# Patient Record
Sex: FEMALE | Race: WHITE
[De-identification: names, ages, dates, MRNs, and addresses within clinical notes are randomized per-mention and may not be internally consistent; named-entity substitution may affect disease eponyms.]

---

## 2017-04-28 ENCOUNTER — HOSPITAL ENCOUNTER (OUTPATIENT)
Dept: HOSPITAL 83 - LAB | Age: 72
Discharge: HOME | End: 2017-04-28
Attending: INTERNAL MEDICINE
Payer: MEDICARE

## 2017-04-28 DIAGNOSIS — E11.9: ICD-10-CM

## 2017-04-28 DIAGNOSIS — I10: ICD-10-CM

## 2017-04-28 DIAGNOSIS — E03.9: Primary | ICD-10-CM

## 2017-04-28 LAB
ALBUMIN SERPL-MCNC: 3.3 GM/DL (ref 3.1–4.5)
ALP SERPL-CCNC: 133 U/L (ref 45–117)
ALT SERPL W P-5'-P-CCNC: 29 U/L (ref 12–78)
AST SERPL-CCNC: 25 IU/L (ref 3–35)
BASOPHILS # BLD AUTO: 0.1 10*3/UL (ref 0–0.1)
BASOPHILS NFR BLD AUTO: 0.9 % (ref 0–1)
BUN SERPL-MCNC: 14 MG/DL (ref 7–24)
CHLORIDE SERPL-SCNC: 101 MMOL/L (ref 98–107)
CHOLEST SERPL-MCNC: 133 MG/DL (ref ?–200)
CO2 SERPL-SCNC: 29 MMOL/L (ref 21–32)
EOSINOPHIL # BLD AUTO: 0.1 10*3/UL (ref 0–0.4)
EOSINOPHIL # BLD AUTO: 2.5 % (ref 1–4)
ERYTHROCYTE [DISTWIDTH] IN BLOOD BY AUTOMATED COUNT: 13.1 % (ref 0–14.5)
EST. AVERAGE GLUCOSE BLD GHB EST-MCNC: 246 MG/DL
GLUCOSE SERPL-MCNC: 217 MG/DL (ref 65–99)
HCT VFR BLD AUTO: 42.6 % (ref 37–47)
HDLC SERPL-MCNC: 61 MG/DL (ref 40–60)
HGB BLD-MCNC: 14.5 G/DL (ref 12–16)
IG #: 0 10*3/UL (ref 0–0.1)
LDLC SERPL DIRECT ASSAY-MCNC: 50 MG/DL (ref 9–159)
LYMPHOCYTES # BLD AUTO: 1.1 10*3/UL (ref 1.3–4.4)
LYMPHOCYTES NFR BLD AUTO: 19 % (ref 27–41)
MCH RBC QN AUTO: 32.7 PG (ref 27–31)
MCHC RBC AUTO-ENTMCNC: 34 G/DL (ref 33–37)
MCV RBC AUTO: 95.9 FL (ref 81–99)
MONOCYTES # BLD AUTO: 0.3 10*3/UL (ref 0.1–1)
MONOCYTES NFR BLD MANUAL: 5.6 % (ref 3–9)
NEUT #: 4.1 10*3/UL (ref 2.3–7.9)
NEUT %: 71.8 % (ref 47–73)
NRBC BLD QL AUTO: 0 % (ref 0–0)
PHOSPHATE SERPL-MCNC: 2.7 MG/DL (ref 2.5–4.9)
PLATELET # BLD AUTO: 125 10*3/UL (ref 130–400)
PMV BLD AUTO: 10.2 FL (ref 9.6–12.3)
POTASSIUM SERPL-SCNC: 4.5 MMOL/L (ref 3.5–5.1)
PROT SERPL-MCNC: 6.9 GM/DL (ref 6.4–8.2)
RBC # BLD AUTO: 4.44 10*6/UL (ref 4.1–5.1)
SODIUM SERPL-SCNC: 139 MMOL/L (ref 136–145)
TRIGL SERPL-MCNC: 108 MG/DL (ref ?–150)
TSH SERPL DL<=0.005 MIU/L-ACNC: 6.93 UIU/ML (ref 0.36–4.75)
VLDLC SERPL CALC-MCNC: 22 MG/DL (ref 6–40)
WBC NRBC COR # BLD AUTO: 5.7 10*3/UL (ref 4.8–10.8)

## 2017-06-09 ENCOUNTER — HOSPITAL ENCOUNTER (EMERGENCY)
Dept: HOSPITAL 83 - ED | Age: 72
Discharge: HOME | End: 2017-06-09
Payer: MEDICARE

## 2017-06-09 VITALS — BODY MASS INDEX: 50.02 KG/M2 | WEIGHT: 293 LBS | HEIGHT: 63.98 IN

## 2017-06-09 VITALS — DIASTOLIC BLOOD PRESSURE: 93 MMHG | SYSTOLIC BLOOD PRESSURE: 167 MMHG

## 2017-06-09 DIAGNOSIS — N30.01: Primary | ICD-10-CM

## 2017-06-09 DIAGNOSIS — Z79.82: ICD-10-CM

## 2017-06-09 DIAGNOSIS — Z79.899: ICD-10-CM

## 2017-06-09 DIAGNOSIS — Z91.041: ICD-10-CM

## 2017-06-09 LAB
ALBUMIN SERPL-MCNC: 2.9 GM/DL (ref 3.1–4.5)
ALBUMIN SERPL-MCNC: NEGATIVE G/DL
ALP SERPL-CCNC: 154 U/L (ref 45–117)
ALT SERPL W P-5'-P-CCNC: 21 U/L (ref 12–78)
APPEARANCE UR: (no result)
AST SERPL-CCNC: 24 IU/L (ref 3–35)
BACTERIA #/AREA URNS HPF: (no result) /[HPF]
BASOPHILS # BLD AUTO: 0 10*3/UL (ref 0–0.1)
BASOPHILS NFR BLD AUTO: 0.6 % (ref 0–1)
BILIRUB UR QL STRIP: NEGATIVE
BUN SERPL-MCNC: 16 MG/DL (ref 7–24)
CHLORIDE SERPL-SCNC: 101 MMOL/L (ref 98–107)
CO2 SERPL-SCNC: 28 MMOL/L (ref 21–32)
COLOR UR: YELLOW
EOSINOPHIL # BLD AUTO: 0.2 10*3/UL (ref 0–0.4)
EOSINOPHIL # BLD AUTO: 4.1 % (ref 1–4)
EPI CELLS #/AREA URNS HPF: (no result) /[HPF]
ERYTHROCYTE [DISTWIDTH] IN BLOOD BY AUTOMATED COUNT: 12.9 % (ref 0–14.5)
GLUCOSE SERPL-MCNC: 304 MG/DL (ref 65–99)
GLUCOSE UR QL: (no result)
HCT VFR BLD AUTO: 36.9 % (ref 37–47)
HGB BLD-MCNC: 12.4 G/DL (ref 12–16)
HGB UR QL STRIP: (no result)
IG #: 0 10*3/UL (ref 0–0.1)
KETONES UR QL STRIP: NEGATIVE
LA>2 REFLEX 2 HR: (no result)
LEUKOCYTE ESTERASE UR QL STRIP: (no result)
LYMPHOCYTES # BLD AUTO: 1.1 10*3/UL (ref 1.3–4.4)
LYMPHOCYTES NFR BLD AUTO: 23.9 % (ref 27–41)
MCH RBC QN AUTO: 32.2 PG (ref 27–31)
MCHC RBC AUTO-ENTMCNC: 33.6 G/DL (ref 33–37)
MCV RBC AUTO: 95.8 FL (ref 81–99)
MONOCYTES # BLD AUTO: 0.3 10*3/UL (ref 0.1–1)
MONOCYTES NFR BLD MANUAL: 5.8 % (ref 3–9)
NEUT #: 3.1 10*3/UL (ref 2.3–7.9)
NEUT %: 65.4 % (ref 47–73)
NITRITE UR QL STRIP: NEGATIVE
NRBC BLD QL AUTO: 0 % (ref 0–0)
PH UR STRIP: 6 [PH] (ref 5–9)
PLATELET # BLD AUTO: 105 10*3/UL (ref 130–400)
PMV BLD AUTO: 10.3 FL (ref 9.6–12.3)
POTASSIUM SERPL-SCNC: 4.4 MMOL/L (ref 3.5–5.1)
PROT SERPL-MCNC: 6.3 GM/DL (ref 6.4–8.2)
RBC # BLD AUTO: 3.85 10*6/UL (ref 4.1–5.1)
RBC #/AREA URNS HPF: (no result) RBC/HPF (ref 0–2)
SODIUM SERPL-SCNC: 139 MMOL/L (ref 136–145)
SP GR UR: <= 1.005 (ref 1–1.03)
URINE REFLEX COMMENT: YES
UROBILINOGEN UR STRIP-MCNC: 0.2 E.U./DL (ref 0.2–1)
WBC #/AREA URNS HPF: (no result) WBC/HPF (ref 0–5)
WBC NRBC COR # BLD AUTO: 4.7 10*3/UL (ref 4.8–10.8)

## 2017-07-17 ENCOUNTER — HOSPITAL ENCOUNTER (EMERGENCY)
Dept: HOSPITAL 83 - ED | Age: 72
LOS: 1 days | Discharge: HOME | End: 2017-07-18
Payer: MEDICARE

## 2017-07-17 VITALS — HEIGHT: 65.98 IN | BODY MASS INDEX: 47.09 KG/M2 | WEIGHT: 293 LBS

## 2017-07-17 VITALS — DIASTOLIC BLOOD PRESSURE: 91 MMHG

## 2017-07-17 DIAGNOSIS — E86.0: ICD-10-CM

## 2017-07-17 DIAGNOSIS — E03.9: ICD-10-CM

## 2017-07-17 DIAGNOSIS — N18.9: ICD-10-CM

## 2017-07-17 DIAGNOSIS — N39.0: Primary | ICD-10-CM

## 2017-07-17 DIAGNOSIS — R31.9: ICD-10-CM

## 2017-07-17 DIAGNOSIS — Z79.899: ICD-10-CM

## 2017-07-17 DIAGNOSIS — Z79.82: ICD-10-CM

## 2017-07-17 DIAGNOSIS — Z91.041: ICD-10-CM

## 2017-07-17 DIAGNOSIS — Z90.49: ICD-10-CM

## 2017-07-17 DIAGNOSIS — Z91.018: ICD-10-CM

## 2017-07-17 DIAGNOSIS — E11.9: ICD-10-CM

## 2017-07-17 DIAGNOSIS — Z79.4: ICD-10-CM

## 2017-07-17 LAB
ALBUMIN SERPL-MCNC: 3.3 GM/DL (ref 3.1–4.5)
ALBUMIN SERPL-MCNC: NEGATIVE G/DL
ALP SERPL-CCNC: 113 U/L (ref 45–117)
ALT SERPL W P-5'-P-CCNC: 23 U/L (ref 12–78)
APPEARANCE UR: CLEAR
AST SERPL-CCNC: 23 IU/L (ref 3–35)
BACTERIA #/AREA URNS HPF: (no result) /[HPF]
BASOPHILS # BLD AUTO: 0 10*3/UL (ref 0–0.1)
BASOPHILS NFR BLD AUTO: 0.7 % (ref 0–1)
BILIRUB UR QL STRIP: NEGATIVE
BUN SERPL-MCNC: 25 MG/DL (ref 7–24)
CHLORIDE SERPL-SCNC: 101 MMOL/L (ref 98–107)
CO2 SERPL-SCNC: 24 MMOL/L (ref 21–32)
COLOR UR: YELLOW
CRP SERPL-MCNC: 0.85 MG/DL (ref 0–0.3)
EOSINOPHIL # BLD AUTO: 0.2 10*3/UL (ref 0–0.4)
EOSINOPHIL # BLD AUTO: 3.2 % (ref 1–4)
EPI CELLS #/AREA URNS HPF: (no result) /[HPF]
ERYTHROCYTE [DISTWIDTH] IN BLOOD BY AUTOMATED COUNT: 13.2 % (ref 0–14.5)
GLUCOSE SERPL-MCNC: 261 MG/DL (ref 65–99)
GLUCOSE UR QL: NEGATIVE
HCT VFR BLD AUTO: 41.1 % (ref 37–47)
HGB BLD-MCNC: 13.7 G/DL (ref 12–16)
HGB UR QL STRIP: (no result)
IG #: 0 10*3/UL (ref 0–0.1)
KETONES UR QL STRIP: NEGATIVE
LA>2 REFLEX 2 HR: (no result)
LEUKOCYTE ESTERASE UR QL STRIP: (no result)
LYMPHOCYTES # BLD AUTO: 1.5 10*3/UL (ref 1.3–4.4)
LYMPHOCYTES NFR BLD AUTO: 26.1 % (ref 27–41)
MAGNESIUM SERPL-MCNC: 1.7 MG/DL (ref 1.5–2.1)
MCH RBC QN AUTO: 32.1 PG (ref 27–31)
MCHC RBC AUTO-ENTMCNC: 33.3 G/DL (ref 33–37)
MCV RBC AUTO: 96.3 FL (ref 81–99)
MONOCYTES # BLD AUTO: 0.3 10*3/UL (ref 0.1–1)
MONOCYTES NFR BLD MANUAL: 5.8 % (ref 3–9)
NEUT #: 3.8 10*3/UL (ref 2.3–7.9)
NEUT %: 63.9 % (ref 47–73)
NITRITE UR QL STRIP: NEGATIVE
NRBC BLD QL AUTO: 0 10*3/UL (ref 0–0)
PH UR STRIP: 5 [PH] (ref 5–9)
PLATELET # BLD AUTO: 139 10*3/UL (ref 130–400)
PMV BLD AUTO: 10.3 FL (ref 9.6–12.3)
POTASSIUM SERPL-SCNC: 4.4 MMOL/L (ref 3.5–5.1)
PROT SERPL-MCNC: 6.8 GM/DL (ref 6.4–8.2)
RBC # BLD AUTO: 4.27 10*6/UL (ref 4.1–5.1)
RBC #/AREA URNS HPF: (no result) RBC/HPF (ref 0–2)
SODIUM SERPL-SCNC: 135 MMOL/L (ref 136–145)
SP GR UR: <= 1.005 (ref 1–1.03)
URINE REFLEX COMMENT: YES
UROBILINOGEN UR STRIP-MCNC: 0.2 E.U./DL (ref 0.2–1)
WBC #/AREA URNS HPF: (no result) WBC/HPF (ref 0–5)
WBC NRBC COR # BLD AUTO: 5.9 10*3/UL (ref 4.8–10.8)

## 2017-07-21 ENCOUNTER — HOSPITAL ENCOUNTER (INPATIENT)
Dept: HOSPITAL 83 - ED | Age: 72
LOS: 1 days | Discharge: HOME | DRG: 871 | End: 2017-07-22
Attending: INTERNAL MEDICINE | Admitting: INTERNAL MEDICINE
Payer: MEDICARE

## 2017-07-21 VITALS — DIASTOLIC BLOOD PRESSURE: 75 MMHG | SYSTOLIC BLOOD PRESSURE: 143 MMHG

## 2017-07-21 VITALS — WEIGHT: 293 LBS | BODY MASS INDEX: 53.92 KG/M2 | HEIGHT: 61.97 IN

## 2017-07-21 VITALS — DIASTOLIC BLOOD PRESSURE: 71 MMHG

## 2017-07-21 VITALS — DIASTOLIC BLOOD PRESSURE: 52 MMHG | SYSTOLIC BLOOD PRESSURE: 143 MMHG

## 2017-07-21 VITALS — DIASTOLIC BLOOD PRESSURE: 67 MMHG

## 2017-07-21 DIAGNOSIS — Z79.4: ICD-10-CM

## 2017-07-21 DIAGNOSIS — E03.9: ICD-10-CM

## 2017-07-21 DIAGNOSIS — E11.22: ICD-10-CM

## 2017-07-21 DIAGNOSIS — E11.65: ICD-10-CM

## 2017-07-21 DIAGNOSIS — K21.9: ICD-10-CM

## 2017-07-21 DIAGNOSIS — I13.0: ICD-10-CM

## 2017-07-21 DIAGNOSIS — Z96.651: ICD-10-CM

## 2017-07-21 DIAGNOSIS — N39.0: ICD-10-CM

## 2017-07-21 DIAGNOSIS — A41.9: Primary | ICD-10-CM

## 2017-07-21 DIAGNOSIS — G47.33: ICD-10-CM

## 2017-07-21 DIAGNOSIS — Z90.49: ICD-10-CM

## 2017-07-21 DIAGNOSIS — E66.01: ICD-10-CM

## 2017-07-21 DIAGNOSIS — Z83.3: ICD-10-CM

## 2017-07-21 DIAGNOSIS — I50.32: ICD-10-CM

## 2017-07-21 DIAGNOSIS — G89.4: ICD-10-CM

## 2017-07-21 DIAGNOSIS — D69.6: ICD-10-CM

## 2017-07-21 DIAGNOSIS — D50.9: ICD-10-CM

## 2017-07-21 DIAGNOSIS — Z82.49: ICD-10-CM

## 2017-07-21 DIAGNOSIS — Z82.3: ICD-10-CM

## 2017-07-21 DIAGNOSIS — E55.9: ICD-10-CM

## 2017-07-21 DIAGNOSIS — N18.2: ICD-10-CM

## 2017-07-21 DIAGNOSIS — Z91.018: ICD-10-CM

## 2017-07-21 DIAGNOSIS — R65.20: ICD-10-CM

## 2017-07-21 DIAGNOSIS — M15.9: ICD-10-CM

## 2017-07-21 DIAGNOSIS — Z91.041: ICD-10-CM

## 2017-07-21 DIAGNOSIS — J44.9: ICD-10-CM

## 2017-07-21 DIAGNOSIS — R19.7: ICD-10-CM

## 2017-07-21 DIAGNOSIS — E43: ICD-10-CM

## 2017-07-21 DIAGNOSIS — D72.810: ICD-10-CM

## 2017-07-21 DIAGNOSIS — E53.9: ICD-10-CM

## 2017-07-21 LAB
ALBUMIN SERPL-MCNC: 2.9 GM/DL (ref 3.1–4.5)
ALBUMIN SERPL-MCNC: NEGATIVE G/DL
ALP SERPL-CCNC: 93 U/L (ref 45–117)
ALT SERPL W P-5'-P-CCNC: 19 U/L (ref 12–78)
APPEARANCE UR: (no result)
AST SERPL-CCNC: 24 IU/L (ref 3–35)
BACTERIA #/AREA URNS HPF: (no result) /[HPF]
BASOPHILS # BLD AUTO: 0 10*3/UL (ref 0–0.1)
BASOPHILS NFR BLD AUTO: 0.2 % (ref 0–1)
BILIRUB UR QL STRIP: NEGATIVE
BUN SERPL-MCNC: 15 MG/DL (ref 7–24)
CHLORIDE SERPL-SCNC: 104 MMOL/L (ref 98–107)
CO2 SERPL-SCNC: 22 MMOL/L (ref 21–32)
COLOR UR: YELLOW
EOSINOPHIL # BLD AUTO: 0.1 10*3/UL (ref 0–0.4)
EOSINOPHIL # BLD AUTO: 0.8 % (ref 1–4)
ERYTHROCYTE [DISTWIDTH] IN BLOOD BY AUTOMATED COUNT: 13.1 % (ref 0–14.5)
GLUCOSE SERPL-MCNC: 195 MG/DL (ref 65–99)
GLUCOSE UR QL: NEGATIVE
HCT VFR BLD AUTO: 40.6 % (ref 37–47)
HGB BLD-MCNC: 13.8 G/DL (ref 12–16)
HGB UR QL STRIP: NEGATIVE
IG #: 0 10*3/UL (ref 0–0.1)
KETONES UR QL STRIP: (no result)
LA>2 REFLEX 2 HR: (no result)
LEUKOCYTE ESTERASE UR QL STRIP: NEGATIVE
LYMPHOCYTES # BLD AUTO: 0.7 10*3/UL (ref 1.3–4.4)
LYMPHOCYTES NFR BLD AUTO: 11.3 % (ref 27–41)
MCH RBC QN AUTO: 32.2 PG (ref 27–31)
MCHC RBC AUTO-ENTMCNC: 34 G/DL (ref 33–37)
MCV RBC AUTO: 94.9 FL (ref 81–99)
MONOCYTES # BLD AUTO: 0.4 10*3/UL (ref 0.1–1)
MONOCYTES NFR BLD MANUAL: 6 % (ref 3–9)
NEUT #: 5.3 10*3/UL (ref 2.3–7.9)
NEUT %: 81.4 % (ref 47–73)
NITRITE UR QL STRIP: NEGATIVE
NRBC BLD QL AUTO: 0 % (ref 0–0)
PH BLDV: 7.45 [PH] (ref 7.32–7.43)
PH UR STRIP: 5.5 [PH] (ref 5–9)
PLATELET # BLD AUTO: 109 10*3/UL (ref 130–400)
PMV BLD AUTO: 9.9 FL (ref 9.6–12.3)
POTASSIUM SERPL-SCNC: 4 MMOL/L (ref 3.5–5.1)
PROT SERPL-MCNC: 6.4 GM/DL (ref 6.4–8.2)
RBC # BLD AUTO: 4.28 10*6/UL (ref 4.1–5.1)
RBC #/AREA URNS HPF: (no result) RBC/HPF (ref 0–2)
SAO2 % BLDV: 97.7 % (ref 40–85)
SODIUM SERPL-SCNC: 138 MMOL/L (ref 136–145)
SP GR UR: <= 1.005 (ref 1–1.03)
TROPONIN I SERPL-MCNC: < 0.015 NG/ML (ref ?–0.04)
URINE REFLEX COMMENT: NO
UROBILINOGEN UR STRIP-MCNC: 0.2 E.U./DL (ref 0.2–1)
WBC #/AREA URNS HPF: (no result) WBC/HPF (ref 0–5)
WBC NRBC COR # BLD AUTO: 6.5 10*3/UL (ref 4.8–10.8)

## 2017-07-22 VITALS — DIASTOLIC BLOOD PRESSURE: 54 MMHG

## 2017-07-22 VITALS — DIASTOLIC BLOOD PRESSURE: 74 MMHG | SYSTOLIC BLOOD PRESSURE: 149 MMHG

## 2017-07-22 VITALS — DIASTOLIC BLOOD PRESSURE: 56 MMHG | SYSTOLIC BLOOD PRESSURE: 110 MMHG

## 2017-07-22 LAB
25(OH)D3 SERPL-MCNC: 46.5 NG/ML (ref 30–100)
ALBUMIN SERPL-MCNC: 2.3 GM/DL (ref 3.1–4.5)
ALP SERPL-CCNC: 69 U/L (ref 45–117)
ALT SERPL W P-5'-P-CCNC: 16 U/L (ref 12–78)
AST SERPL-CCNC: 20 IU/L (ref 3–35)
BASOPHILS # BLD AUTO: 0 10*3/UL (ref 0–0.1)
BASOPHILS NFR BLD AUTO: 0.2 % (ref 0–1)
BUN SERPL-MCNC: 14 MG/DL (ref 7–24)
CHLORIDE SERPL-SCNC: 109 MMOL/L (ref 98–107)
CO2 SERPL-SCNC: 26 MMOL/L (ref 21–32)
EOSINOPHIL # BLD AUTO: 0.1 10*3/UL (ref 0–0.4)
EOSINOPHIL # BLD AUTO: 1.6 % (ref 1–4)
ERYTHROCYTE [DISTWIDTH] IN BLOOD BY AUTOMATED COUNT: 13.3 % (ref 0–14.5)
EST. AVERAGE GLUCOSE BLD GHB EST-MCNC: 189 MG/DL
FOLATE SERPL-MCNC: 23.39 NG/ML (ref 5.38–?)
GLUCOSE SERPL-MCNC: 83 MG/DL (ref 65–99)
HCT VFR BLD AUTO: 34 % (ref 37–47)
HGB BLD-MCNC: 11.4 G/DL (ref 12–16)
IG #: 0 10*3/UL (ref 0–0.1)
INR BLD: 1.1 (ref 2–3.5)
LA>2 REFLEX 4 HR: (no result)
LA>2 RFLX FOLLOW UP AT 2 HRS: 2.8 MMOL/L (ref 0.4–2)
LYMPHOCYTES # BLD AUTO: 0.9 10*3/UL (ref 1.3–4.4)
LYMPHOCYTES NFR BLD AUTO: 17.7 % (ref 27–41)
MAGNESIUM SERPL-MCNC: 1.5 MG/DL (ref 1.5–2.1)
MCH RBC QN AUTO: 32.6 PG (ref 27–31)
MCHC RBC AUTO-ENTMCNC: 33.5 G/DL (ref 33–37)
MCV RBC AUTO: 97.1 FL (ref 81–99)
MONOCYTES # BLD AUTO: 0.3 10*3/UL (ref 0.1–1)
MONOCYTES NFR BLD MANUAL: 6.6 % (ref 3–9)
NEUT #: 3.8 10*3/UL (ref 2.3–7.9)
NEUT %: 73.7 % (ref 47–73)
NRBC BLD QL AUTO: 0 10*3/UL (ref 0–0)
PHOSPHATE SERPL-MCNC: 2.2 MG/DL (ref 2.5–4.9)
PLATELET # BLD AUTO: 91 10*3/UL (ref 130–400)
PMV BLD AUTO: 10.3 FL (ref 9.6–12.3)
POTASSIUM SERPL-SCNC: 3.9 MMOL/L (ref 3.5–5.1)
PROT SERPL-MCNC: 5.2 GM/DL (ref 6.4–8.2)
PROTHROMBIN TIME: 12.2 SECONDS (ref 9–12.4)
RBC # BLD AUTO: 3.5 10*6/UL (ref 4.1–5.1)
SODIUM SERPL-SCNC: 143 MMOL/L (ref 136–145)
T4 FREE SERPL-MCNC: 0.91 NG/DL (ref 0.76–1.46)
TSH SERPL DL<=0.005 MIU/L-ACNC: 4.77 UIU/ML (ref 0.36–4.75)
VITAMIN B12: 394 PG/ML (ref 247–911)
WBC NRBC COR # BLD AUTO: 5.1 10*3/UL (ref 4.8–10.8)

## 2018-05-16 ENCOUNTER — HOSPITAL ENCOUNTER (INPATIENT)
Dept: HOSPITAL 83 - ED | Age: 73
LOS: 4 days | Discharge: HOME | DRG: 190 | End: 2018-05-20
Attending: INTERNAL MEDICINE | Admitting: INTERNAL MEDICINE
Payer: MEDICARE

## 2018-05-16 VITALS
SYSTOLIC BLOOD PRESSURE: 114 MMHG | DIASTOLIC BLOOD PRESSURE: 69 MMHG | HEIGHT: 64 IN | WEIGHT: 293 LBS | BODY MASS INDEX: 50.02 KG/M2

## 2018-05-16 VITALS — DIASTOLIC BLOOD PRESSURE: 65 MMHG

## 2018-05-16 VITALS — DIASTOLIC BLOOD PRESSURE: 77 MMHG

## 2018-05-16 VITALS — DIASTOLIC BLOOD PRESSURE: 76 MMHG | SYSTOLIC BLOOD PRESSURE: 169 MMHG

## 2018-05-16 DIAGNOSIS — Z90.49: ICD-10-CM

## 2018-05-16 DIAGNOSIS — D69.6: ICD-10-CM

## 2018-05-16 DIAGNOSIS — D50.9: ICD-10-CM

## 2018-05-16 DIAGNOSIS — G47.33: ICD-10-CM

## 2018-05-16 DIAGNOSIS — N30.00: ICD-10-CM

## 2018-05-16 DIAGNOSIS — Z79.4: ICD-10-CM

## 2018-05-16 DIAGNOSIS — I50.9: ICD-10-CM

## 2018-05-16 DIAGNOSIS — Z82.49: ICD-10-CM
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
## 2018-05-16 DIAGNOSIS — J20.9: ICD-10-CM

## 2018-05-16 DIAGNOSIS — E87.2: ICD-10-CM

## 2018-05-16 DIAGNOSIS — J44.0: Primary | ICD-10-CM

## 2018-05-16 DIAGNOSIS — T17.590A: ICD-10-CM
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
## 2018-05-16 DIAGNOSIS — Z96.653: ICD-10-CM

## 2018-05-16 DIAGNOSIS — G89.4: ICD-10-CM

## 2018-05-16 DIAGNOSIS — E44.0: ICD-10-CM

## 2018-05-16 DIAGNOSIS — E66.01: ICD-10-CM

## 2018-05-16 DIAGNOSIS — E55.9: ICD-10-CM
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
## 2018-05-16 DIAGNOSIS — E11.22: ICD-10-CM

## 2018-05-16 DIAGNOSIS — M19.90: ICD-10-CM

## 2018-05-16 DIAGNOSIS — J18.9: ICD-10-CM

## 2018-05-16 DIAGNOSIS — Z88.8: ICD-10-CM

## 2018-05-16 DIAGNOSIS — E53.9: ICD-10-CM

## 2018-05-16 DIAGNOSIS — E03.9: ICD-10-CM

## 2018-05-16 DIAGNOSIS — Z79.899: ICD-10-CM

## 2018-05-16 DIAGNOSIS — Z82.5: ICD-10-CM

## 2018-05-16 DIAGNOSIS — Z79.82: ICD-10-CM

## 2018-05-16 DIAGNOSIS — K21.9: ICD-10-CM
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
## 2018-05-16 DIAGNOSIS — I13.0: ICD-10-CM

## 2018-05-16 DIAGNOSIS — Z90.710: ICD-10-CM

## 2018-05-16 DIAGNOSIS — R19.7: ICD-10-CM

## 2018-05-16 DIAGNOSIS — J45.901: ICD-10-CM

## 2018-05-16 DIAGNOSIS — J44.1: ICD-10-CM

## 2018-05-16 DIAGNOSIS — N18.2: ICD-10-CM

## 2018-05-16 DIAGNOSIS — Z91.041: ICD-10-CM

## 2018-05-16 DIAGNOSIS — E11.65: ICD-10-CM

## 2018-05-16 LAB
ALBUMIN SERPL-MCNC: 2.8 GM/DL (ref 3.1–4.5)
ALP SERPL-CCNC: 107 U/L (ref 45–117)
ALT SERPL W P-5'-P-CCNC: 17 U/L (ref 12–78)
APPEARANCE UR: CLEAR
APTT PPP: 22.3 SECONDS (ref 20.8–31.5)
AST SERPL-CCNC: 22 IU/L (ref 3–35)
BACTERIA #/AREA URNS HPF: (no result) /[HPF]
BASOPHILS # BLD AUTO: 0 10*3/UL (ref 0–0.1)
BASOPHILS NFR BLD AUTO: 0.8 % (ref 0–1)
BILIRUB UR QL STRIP: NEGATIVE
BUN SERPL-MCNC: 16 MG/DL (ref 7–24)
CHLORIDE SERPL-SCNC: 104 MMOL/L (ref 98–107)
COLOR UR: YELLOW
CREAT SERPL-MCNC: 0.94 MG/DL (ref 0.55–1.02)
EOSINOPHIL # BLD AUTO: 0.1 10*3/UL (ref 0–0.4)
EOSINOPHIL # BLD AUTO: 2.4 % (ref 1–4)
EPI CELLS #/AREA URNS HPF: (no result) /[HPF]
ERYTHROCYTE [DISTWIDTH] IN BLOOD BY AUTOMATED COUNT: 12.7 % (ref 0–14.5)
GLUCOSE UR QL: NEGATIVE
HCT VFR BLD AUTO: 35 % (ref 37–47)
HGB BLD-MCNC: 11.8 G/DL (ref 12–16)
HGB UR QL STRIP: NEGATIVE
INR BLD: 1 (ref 2–3.5)
KETONES UR QL STRIP: NEGATIVE
LEUKOCYTE ESTERASE UR QL STRIP: NEGATIVE
LYMPHOCYTES # BLD AUTO: 0.9 10*3/UL (ref 1.3–4.4)
LYMPHOCYTES NFR BLD AUTO: 18.7 % (ref 27–41)
MCH RBC QN AUTO: 32.4 PG (ref 27–31)
MCHC RBC AUTO-ENTMCNC: 33.7 G/DL (ref 33–37)
MCV RBC AUTO: 96.2 FL (ref 81–99)
MONOCYTES # BLD AUTO: 0.2 10*3/UL (ref 0.1–1)
MONOCYTES NFR BLD MANUAL: 4.4 % (ref 3–9)
NEUT #: 3.7 10*3/UL (ref 2.3–7.9)
NEUT %: 73.5 % (ref 47–73)
NITRITE UR QL STRIP: NEGATIVE
NRBC BLD QL AUTO: 0 % (ref 0–0)
PH UR STRIP: 8.5 [PH] (ref 5–9)
PLATELET # BLD AUTO: 126 10*3/UL (ref 130–400)
PMV BLD AUTO: 9.8 FL (ref 9.6–12.3)
POTASSIUM SERPL-SCNC: 4 MMOL/L (ref 3.5–5.1)
PROT SERPL-MCNC: 6.9 GM/DL (ref 6.4–8.2)
RBC # BLD AUTO: 3.64 10*6/UL (ref 4.1–5.1)
RBC #/AREA URNS HPF: (no result) RBC/HPF (ref 0–2)
SODIUM SERPL-SCNC: 138 MMOL/L (ref 136–145)
SP GR UR: 1.01 (ref 1–1.03)
TROPONIN I SERPL-MCNC: < 0.015 NG/ML (ref ?–0.04)
UROBILINOGEN UR STRIP-MCNC: 0.2 E.U./DL (ref 0.2–1)
WBC #/AREA URNS HPF: (no result) WBC/HPF (ref 0–5)
WBC NRBC COR # BLD AUTO: 5 10*3/UL (ref 4.8–10.8)

## 2018-05-17 VITALS — DIASTOLIC BLOOD PRESSURE: 87 MMHG

## 2018-05-17 VITALS — SYSTOLIC BLOOD PRESSURE: 146 MMHG | DIASTOLIC BLOOD PRESSURE: 84 MMHG

## 2018-05-17 VITALS — SYSTOLIC BLOOD PRESSURE: 154 MMHG | DIASTOLIC BLOOD PRESSURE: 90 MMHG

## 2018-05-17 VITALS — DIASTOLIC BLOOD PRESSURE: 70 MMHG

## 2018-05-17 VITALS — DIASTOLIC BLOOD PRESSURE: 68 MMHG

## 2018-05-17 LAB
25(OH)D3 SERPL-MCNC: 31.5 NG/ML (ref 30–100)
ALBUMIN SERPL-MCNC: 2.8 GM/DL (ref 3.1–4.5)
ALP SERPL-CCNC: 95 U/L (ref 45–117)
ALT SERPL W P-5'-P-CCNC: 15 U/L (ref 12–78)
APTT PPP: 22.9 SECONDS (ref 20.8–31.5)
AST SERPL-CCNC: 22 IU/L (ref 3–35)
BASOPHILS # BLD AUTO: 0 10*3/UL (ref 0–0.1)
BASOPHILS NFR BLD AUTO: 0 % (ref 0–1)
BUN SERPL-MCNC: 19 MG/DL (ref 7–24)
CHLORIDE SERPL-SCNC: 101 MMOL/L (ref 98–107)
CHOLEST SERPL-MCNC: 143 MG/DL (ref ?–200)
CREAT SERPL-MCNC: 1.06 MG/DL (ref 0.55–1.02)
EOSINOPHIL # BLD AUTO: 0 % (ref 1–4)
EOSINOPHIL # BLD AUTO: 0 10*3/UL (ref 0–0.4)
ERYTHROCYTE [DISTWIDTH] IN BLOOD BY AUTOMATED COUNT: 12.6 % (ref 0–14.5)
HCT VFR BLD AUTO: 37.3 % (ref 37–47)
HDLC SERPL-MCNC: 51 MG/DL (ref 40–60)
HGB BLD-MCNC: 12.4 G/DL (ref 12–16)
INR BLD: 1.1 (ref 2–3.5)
LDLC SERPL DIRECT ASSAY-MCNC: 75 MG/DL (ref 9–159)
LYMPHOCYTES # BLD AUTO: 0.6 10*3/UL (ref 1.3–4.4)
LYMPHOCYTES NFR BLD AUTO: 13.3 % (ref 27–41)
MCH RBC QN AUTO: 32 PG (ref 27–31)
MCHC RBC AUTO-ENTMCNC: 33.2 G/DL (ref 33–37)
MCV RBC AUTO: 96.4 FL (ref 81–99)
MONOCYTES # BLD AUTO: 0 10*3/UL (ref 0.1–1)
MONOCYTES NFR BLD MANUAL: 0.5 % (ref 3–9)
NEUT #: 3.6 10*3/UL (ref 2.3–7.9)
NEUT %: 85.7 % (ref 47–73)
NRBC BLD QL AUTO: 0 % (ref 0–0)
PHOSPHATE SERPL-MCNC: 3.3 MG/DL (ref 2.5–4.9)
PLATELET # BLD AUTO: 110 10*3/UL (ref 130–400)
PMV BLD AUTO: 9.9 FL (ref 9.6–12.3)
POTASSIUM SERPL-SCNC: 4.2 MMOL/L (ref 3.5–5.1)
PROT SERPL-MCNC: 6.7 GM/DL (ref 6.4–8.2)
RBC # BLD AUTO: 3.87 10*6/UL (ref 4.1–5.1)
SODIUM SERPL-SCNC: 136 MMOL/L (ref 136–145)
T4 FREE SERPL-MCNC: 1 NG/DL (ref 0.76–1.46)
TRIGL SERPL-MCNC: 83 MG/DL (ref ?–150)
TSH SERPL DL<=0.005 MIU/L-ACNC: 1.76 UIU/ML (ref 0.36–4.75)
VITAMIN B12: 492 PG/ML (ref 247–911)
VLDLC SERPL CALC-MCNC: 17 MG/DL (ref 6–40)
WBC NRBC COR # BLD AUTO: 4.2 10*3/UL (ref 4.8–10.8)

## 2018-05-18 VITALS — SYSTOLIC BLOOD PRESSURE: 126 MMHG | DIASTOLIC BLOOD PRESSURE: 66 MMHG

## 2018-05-18 VITALS — DIASTOLIC BLOOD PRESSURE: 83 MMHG

## 2018-05-18 VITALS — SYSTOLIC BLOOD PRESSURE: 118 MMHG | DIASTOLIC BLOOD PRESSURE: 53 MMHG

## 2018-05-18 VITALS — SYSTOLIC BLOOD PRESSURE: 101 MMHG | DIASTOLIC BLOOD PRESSURE: 62 MMHG

## 2018-05-18 VITALS — SYSTOLIC BLOOD PRESSURE: 140 MMHG | DIASTOLIC BLOOD PRESSURE: 65 MMHG

## 2018-05-18 VITALS — DIASTOLIC BLOOD PRESSURE: 60 MMHG | SYSTOLIC BLOOD PRESSURE: 122 MMHG

## 2018-05-18 VITALS — SYSTOLIC BLOOD PRESSURE: 124 MMHG | DIASTOLIC BLOOD PRESSURE: 58 MMHG

## 2018-05-18 VITALS — DIASTOLIC BLOOD PRESSURE: 58 MMHG | SYSTOLIC BLOOD PRESSURE: 143 MMHG

## 2018-05-18 VITALS — SYSTOLIC BLOOD PRESSURE: 123 MMHG | DIASTOLIC BLOOD PRESSURE: 56 MMHG

## 2018-05-18 LAB
BASOPHILS # BLD AUTO: 0 10*3/UL (ref 0–0.1)
BASOPHILS NFR BLD AUTO: 0 % (ref 0–1)
BUN SERPL-MCNC: 25 MG/DL (ref 7–24)
CHLORIDE SERPL-SCNC: 99 MMOL/L (ref 98–107)
CREAT SERPL-MCNC: 0.95 MG/DL (ref 0.55–1.02)
EOSINOPHIL # BLD AUTO: 0 % (ref 1–4)
EOSINOPHIL # BLD AUTO: 0 10*3/UL (ref 0–0.4)
ERYTHROCYTE [DISTWIDTH] IN BLOOD BY AUTOMATED COUNT: 12.7 % (ref 0–14.5)
HCT VFR BLD AUTO: 35.2 % (ref 37–47)
HGB BLD-MCNC: 11.6 G/DL (ref 12–16)
LYMPHOCYTES # BLD AUTO: 0.5 10*3/UL (ref 1.3–4.4)
LYMPHOCYTES NFR BLD AUTO: 8 % (ref 27–41)
MCH RBC QN AUTO: 31.4 PG (ref 27–31)
MCHC RBC AUTO-ENTMCNC: 33 G/DL (ref 33–37)
MCV RBC AUTO: 95.1 FL (ref 81–99)
MONOCYTES # BLD AUTO: 0.1 10*3/UL (ref 0.1–1)
MONOCYTES NFR BLD MANUAL: 2.2 % (ref 3–9)
NEUT #: 5.3 10*3/UL (ref 2.3–7.9)
NEUT %: 89.3 % (ref 47–73)
NRBC BLD QL AUTO: 0 % (ref 0–0)
PLATELET # BLD AUTO: 106 10*3/UL (ref 130–400)
PMV BLD AUTO: 10.1 FL (ref 9.6–12.3)
POTASSIUM SERPL-SCNC: 4.3 MMOL/L (ref 3.5–5.1)
RBC # BLD AUTO: 3.7 10*6/UL (ref 4.1–5.1)
SODIUM SERPL-SCNC: 136 MMOL/L (ref 136–145)
WBC NRBC COR # BLD AUTO: 5.9 10*3/UL (ref 4.8–10.8)

## 2018-05-19 VITALS — SYSTOLIC BLOOD PRESSURE: 132 MMHG | DIASTOLIC BLOOD PRESSURE: 84 MMHG

## 2018-05-19 VITALS — DIASTOLIC BLOOD PRESSURE: 65 MMHG

## 2018-05-19 VITALS — DIASTOLIC BLOOD PRESSURE: 66 MMHG | SYSTOLIC BLOOD PRESSURE: 138 MMHG

## 2018-05-19 VITALS — DIASTOLIC BLOOD PRESSURE: 57 MMHG

## 2018-05-19 VITALS — DIASTOLIC BLOOD PRESSURE: 59 MMHG

## 2018-05-19 LAB — SPECIMEN PREPARATION: (no result)

## 2018-05-20 VITALS — DIASTOLIC BLOOD PRESSURE: 61 MMHG

## 2018-05-20 VITALS — SYSTOLIC BLOOD PRESSURE: 135 MMHG | DIASTOLIC BLOOD PRESSURE: 57 MMHG

## 2018-05-20 VITALS — DIASTOLIC BLOOD PRESSURE: 72 MMHG | SYSTOLIC BLOOD PRESSURE: 135 MMHG

## 2018-05-20 LAB
BASOPHILS # BLD AUTO: 0 10*3/UL (ref 0–0.1)
BASOPHILS NFR BLD AUTO: 0 % (ref 0–1)
BUN SERPL-MCNC: 31 MG/DL (ref 7–24)
CHLORIDE SERPL-SCNC: 101 MMOL/L (ref 98–107)
CREAT SERPL-MCNC: 0.94 MG/DL (ref 0.55–1.02)
EOSINOPHIL # BLD AUTO: 0 10*3/UL (ref 0–0.4)
EOSINOPHIL # BLD AUTO: 0.3 % (ref 1–4)
ERYTHROCYTE [DISTWIDTH] IN BLOOD BY AUTOMATED COUNT: 12.7 % (ref 0–14.5)
HCT VFR BLD AUTO: 36.2 % (ref 37–47)
HGB BLD-MCNC: 11.8 G/DL (ref 12–16)
LYMPHOCYTES # BLD AUTO: 0.3 10*3/UL (ref 1.3–4.4)
LYMPHOCYTES NFR BLD AUTO: 10.6 % (ref 27–41)
MCH RBC QN AUTO: 31.4 PG (ref 27–31)
MCHC RBC AUTO-ENTMCNC: 32.6 G/DL (ref 33–37)
MCV RBC AUTO: 96.3 FL (ref 81–99)
MONOCYTES # BLD AUTO: 0.1 10*3/UL (ref 0.1–1)
MONOCYTES NFR BLD MANUAL: 2.2 % (ref 3–9)
NEUT #: 2.8 10*3/UL (ref 2.3–7.9)
NEUT %: 86.3 % (ref 47–73)
NRBC BLD QL AUTO: 0 % (ref 0–0)
PHOSPHATE SERPL-MCNC: 3.6 MG/DL (ref 2.5–4.9)
PLATELET # BLD AUTO: 90 10*3/UL (ref 130–400)
PMV BLD AUTO: 10.8 FL (ref 9.6–12.3)
POTASSIUM SERPL-SCNC: 4.1 MMOL/L (ref 3.5–5.1)
RBC # BLD AUTO: 3.76 10*6/UL (ref 4.1–5.1)
SODIUM SERPL-SCNC: 137 MMOL/L (ref 136–145)
WBC NRBC COR # BLD AUTO: 3.2 10*3/UL (ref 4.8–10.8)

## 2019-02-18 ENCOUNTER — HOSPITAL ENCOUNTER (INPATIENT)
Dept: HOSPITAL 83 - ED | Age: 74
LOS: 4 days | Discharge: HOME | DRG: 871 | End: 2019-02-22
Attending: INTERNAL MEDICINE | Admitting: INTERNAL MEDICINE
Payer: MEDICARE

## 2019-02-18 VITALS — DIASTOLIC BLOOD PRESSURE: 62 MMHG

## 2019-02-18 VITALS — BODY MASS INDEX: 51.04 KG/M2 | WEIGHT: 288.06 LBS | HEIGHT: 62.99 IN

## 2019-02-18 VITALS — DIASTOLIC BLOOD PRESSURE: 75 MMHG | SYSTOLIC BLOOD PRESSURE: 140 MMHG

## 2019-02-18 VITALS — DIASTOLIC BLOOD PRESSURE: 86 MMHG

## 2019-02-18 DIAGNOSIS — I13.0: ICD-10-CM

## 2019-02-18 DIAGNOSIS — Z84.89: ICD-10-CM

## 2019-02-18 DIAGNOSIS — E83.39: ICD-10-CM

## 2019-02-18 DIAGNOSIS — E11.22: ICD-10-CM

## 2019-02-18 DIAGNOSIS — Z90.710: ICD-10-CM

## 2019-02-18 DIAGNOSIS — L89.893: ICD-10-CM

## 2019-02-18 DIAGNOSIS — J18.9: ICD-10-CM

## 2019-02-18 DIAGNOSIS — Z91.041: ICD-10-CM

## 2019-02-18 DIAGNOSIS — Z87.440: ICD-10-CM

## 2019-02-18 DIAGNOSIS — E43: ICD-10-CM

## 2019-02-18 DIAGNOSIS — I50.32: ICD-10-CM

## 2019-02-18 DIAGNOSIS — Z96.651: ICD-10-CM

## 2019-02-18 DIAGNOSIS — R65.20: ICD-10-CM

## 2019-02-18 DIAGNOSIS — J44.0: ICD-10-CM

## 2019-02-18 DIAGNOSIS — L89.892: ICD-10-CM

## 2019-02-18 DIAGNOSIS — K52.9: ICD-10-CM

## 2019-02-18 DIAGNOSIS — E53.9: ICD-10-CM

## 2019-02-18 DIAGNOSIS — Z79.4: ICD-10-CM

## 2019-02-18 DIAGNOSIS — G47.33: ICD-10-CM

## 2019-02-18 DIAGNOSIS — N18.2: ICD-10-CM

## 2019-02-18 DIAGNOSIS — L89.150: ICD-10-CM

## 2019-02-18 DIAGNOSIS — E66.01: ICD-10-CM

## 2019-02-18 DIAGNOSIS — K21.9: ICD-10-CM

## 2019-02-18 DIAGNOSIS — Z82.3: ICD-10-CM

## 2019-02-18 DIAGNOSIS — Z91.018: ICD-10-CM

## 2019-02-18 DIAGNOSIS — E11.51: ICD-10-CM

## 2019-02-18 DIAGNOSIS — D72.810: ICD-10-CM

## 2019-02-18 DIAGNOSIS — E03.9: ICD-10-CM

## 2019-02-18 DIAGNOSIS — Z82.49: ICD-10-CM

## 2019-02-18 DIAGNOSIS — I25.10: ICD-10-CM

## 2019-02-18 DIAGNOSIS — Z79.82: ICD-10-CM

## 2019-02-18 DIAGNOSIS — G89.4: ICD-10-CM

## 2019-02-18 DIAGNOSIS — Z83.3: ICD-10-CM

## 2019-02-18 DIAGNOSIS — M15.9: ICD-10-CM

## 2019-02-18 DIAGNOSIS — Z90.49: ICD-10-CM

## 2019-02-18 DIAGNOSIS — N17.0: ICD-10-CM

## 2019-02-18 DIAGNOSIS — E83.42: ICD-10-CM

## 2019-02-18 DIAGNOSIS — R07.89: ICD-10-CM

## 2019-02-18 DIAGNOSIS — Z91.048: ICD-10-CM

## 2019-02-18 DIAGNOSIS — B37.9: ICD-10-CM

## 2019-02-18 DIAGNOSIS — A41.9: Primary | ICD-10-CM

## 2019-02-18 DIAGNOSIS — E11.65: ICD-10-CM

## 2019-02-18 DIAGNOSIS — Z87.01: ICD-10-CM

## 2019-02-18 DIAGNOSIS — Z79.899: ICD-10-CM

## 2019-02-18 LAB
ALBUMIN SERPL-MCNC: 3 GM/DL (ref 3.1–4.5)
ALP SERPL-CCNC: 109 U/L (ref 45–117)
ALT SERPL W P-5'-P-CCNC: 19 U/L (ref 12–78)
APPEARANCE UR: CLEAR
APTT PPP: 23.6 SECONDS (ref 20.8–31.5)
AST SERPL-CCNC: 28 IU/L (ref 3–35)
BASOPHILS # BLD AUTO: 0 10*3/UL (ref 0–0.1)
BASOPHILS NFR BLD AUTO: 0.4 % (ref 0–1)
BILIRUB UR QL STRIP: NEGATIVE
BUN SERPL-MCNC: 20 MG/DL (ref 7–24)
CHLORIDE SERPL-SCNC: 101 MMOL/L (ref 98–107)
COLOR UR: YELLOW
CREAT SERPL-MCNC: 1.22 MG/DL (ref 0.55–1.02)
EOSINOPHIL # BLD AUTO: 0.1 10*3/UL (ref 0–0.4)
EOSINOPHIL # BLD AUTO: 1 % (ref 1–4)
ERYTHROCYTE [DISTWIDTH] IN BLOOD BY AUTOMATED COUNT: 12.9 % (ref 0–14.5)
GLUCOSE UR QL: (no result)
HCT VFR BLD AUTO: 41.4 % (ref 37–47)
HGB BLD-MCNC: 14.2 G/DL (ref 12–16)
HGB UR QL STRIP: NEGATIVE
INR BLD: 1.1 (ref 2–3.5)
KETONES UR QL STRIP: (no result)
LEUKOCYTE ESTERASE UR QL STRIP: NEGATIVE
LYMPHOCYTES # BLD AUTO: 0.4 10*3/UL (ref 1.3–4.4)
LYMPHOCYTES NFR BLD AUTO: 9.1 % (ref 27–41)
MCH RBC QN AUTO: 32.9 PG (ref 27–31)
MCHC RBC AUTO-ENTMCNC: 34.3 G/DL (ref 33–37)
MCV RBC AUTO: 96.1 FL (ref 81–99)
MONOCYTES # BLD AUTO: 0.2 10*3/UL (ref 0.1–1)
MONOCYTES NFR BLD MANUAL: 4.1 % (ref 3–9)
NEUT #: 4.1 10*3/UL (ref 2.3–7.9)
NEUT %: 85.2 % (ref 47–73)
NITRITE UR QL STRIP: NEGATIVE
NRBC BLD QL AUTO: 0 % (ref 0–0)
PH UR STRIP: 8.5 [PH] (ref 5–9)
PLATELET # BLD AUTO: 96 10*3/UL (ref 130–400)
PMV BLD AUTO: 9.9 FL (ref 9.6–12.3)
POTASSIUM SERPL-SCNC: 4.6 MMOL/L (ref 3.5–5.1)
PROT SERPL-MCNC: 6.6 GM/DL (ref 6.4–8.2)
RBC # BLD AUTO: 4.31 10*6/UL (ref 4.1–5.1)
SODIUM SERPL-SCNC: 138 MMOL/L (ref 136–145)
SP GR UR: 1.02 (ref 1–1.03)
TROPONIN I SERPL-MCNC: < 0.015 NG/ML (ref ?–0.04)
UROBILINOGEN UR STRIP-MCNC: 0.2 E.U./DL (ref 0.2–1)
WBC NRBC COR # BLD AUTO: 4.9 10*3/UL (ref 4.8–10.8)

## 2019-02-18 NOTE — EKG
[place], [place]
 
                               ELECTROCARDIOGRAM REPORT
 
        NAME: JOSE WHITMAN                    ACCT #: [id number]  
        UNIT #: [id number]                        ROOM: 407       
        DOCTOR: JAMSHID DRAFT REPORT          BIRTHDATE: [birth year]
 
 
 

 
 
                           [facility]
                                       
Test Date:    2019               Test Time:    01:20:19
Pat Name:     JOSE WHITMAN              Department:   
Patient ID:   [id number]             Room:         407
Gender:       F                        Technician:   Diana Espinal
:          1945               Requested By: CORNELL KRUEGER
Order Number: [id number]      Reading MD:   Arturo Moreno MD
                                 Measurements
Intervals                              Axis          
Rate:         122                      P:            68
WI:           141                      QRS:          58
QRSD:         91                       T:            15
QT:           287                                    
QTc:          409                                    
                           Interpretive Statements
Sinus tachycardia
Compared to earlier ECG this date
Sinus tachycardia is now present
 
Electronically Signed On 2019 4:15:33 PST by Arturo Moreno MD
 
CM:EKGRPT:ELECTROCARDIOGRAM REPORT
 
D: 19 0120
T: 19 0415
    
CORNELL UPTON DRAFT REPORT         
CORNELL KRUEGER DO

## 2019-02-18 NOTE — EKG
[place], [place]
 
                               ELECTROCARDIOGRAM REPORT
 
        NAME: JOSE WHITMAN                    ACCT #: [id number]  
        UNIT #: [id number]                        ROOM: 407       
        DOCTOR: JAMSHID DRAFT REPORT          BIRTHDATE: [birth year]
 
 
 

 
 
                           [facility]
                                       
Test Date:    2019               Test Time:    19:24:19
Pat Name:     JOSE WHITMAN              Department:   
Patient ID:   [id number]             Room:         407
Gender:       F                        Technician:   Diana Espinal
:          1945               Requested By: CORNELL KRUEGER
Order Number: [id number]      Reading MD:   Arturo Moreno MD
                                 Measurements
Intervals                              Axis          
Rate:         113                      P:            64
DC:           161                      QRS:          42
QRSD:         84                       T:            25
QT:           344                                    
QTc:          472                                    
                           Interpretive Statements
Sinus tachycardia
Low voltage, precordial leads
Borderline T abnormalities, anterior leads
 
 
Electronically Signed On 2019 4:11:31 PST by Arturo Moreno MD
 
CM:EKGRPT:ELECTROCARDIOGRAM REPORT
 
D: 19
T: 19 0411
    
CORNELL UPTON DRAFT REPORT         
CORNELL KRUEGER DO

## 2019-02-18 NOTE — EKG
[place], [place]
 
                               ELECTROCARDIOGRAM REPORT
 
        NAME: JOSE WHITMAN                    ACCT #: [id number]  
        UNIT #: [id number]                        ROOM: 407       
        DOCTOR: JAMSHID DRAFT REPORT          BIRTHDATE: [birth year]
 
 
 

 
 
                           [facility]
                                       
Test Date:    2019               Test Time:    23:03:17
Pat Name:     JOSE WHITMAN              Department:   
Patient ID:   [id number]             Room:         407
Gender:       F                        Technician:   Diana Espinal
:          1945               Requested By: CORNELL KRUEGER
Order Number: [id number]      Reading MD:   Arturo Moreno MD
                                 Measurements
Intervals                              Axis          
Rate:         94                       P:            22
TX:           142                      QRS:          22
QRSD:         86                       T:            11
QT:           365                                    
QTc:          457                                    
                           Interpretive Statements
Sinus rhythm
Baseline wander in lead(s) I
Compared to earlier ECG this date
Sinus tachycardia is no longer present
 
Electronically Signed On 2019 4:13:59 PST by Arturo Moreno MD
 
CM:EKGRPT:ELECTROCARDIOGRAM REPORT
 
D: 19
T: 19 0413
    
CORNELL UPTON DRAFT REPORT         
CORNELL KRUEGER DO

## 2019-02-18 NOTE — NUR
A 73, admitted to 4E, under the
services of LEROY Nichols DO with a diagnosis of CHEST PAIN.
Chief complaint is CHEST PAIN, SOB.
Patient arrived via stretcher from ER.
Monitor applied. Initial assessment completed.
Vital signs taken and recorded.
LEROY NICHOLS DO notified of admission to the unit.
Orders received.
See assessment for past medical history, medications
and allergies.
Patient and/or family oriented to unit. [facility]
visitation policy reviewed.
Clothing/patient valuable form completed.
 
JORGE L FERRARA

## 2019-02-18 NOTE — NUR
NOTIFIED OF PATIENT WEARING CPAP @ HOME @ HS. HOME SETTINGS 12.0.
PATIENT DOES NOT HAVE MACHINE WITH HER. NEW ORDERS TO FOLLOW.
ALSO AWARE THAT HOME MED REC UP TO DATE-RN JUST NEEDS TO UPDATE VITAMINS.
 ALSO NOTIFIED OF WOUNDS TO BL FEET & DTI TO BUTTOCKS.

## 2019-02-19 VITALS — DIASTOLIC BLOOD PRESSURE: 95 MMHG | SYSTOLIC BLOOD PRESSURE: 179 MMHG

## 2019-02-19 VITALS — SYSTOLIC BLOOD PRESSURE: 140 MMHG | DIASTOLIC BLOOD PRESSURE: 65 MMHG

## 2019-02-19 VITALS — DIASTOLIC BLOOD PRESSURE: 80 MMHG | SYSTOLIC BLOOD PRESSURE: 175 MMHG

## 2019-02-19 VITALS — SYSTOLIC BLOOD PRESSURE: 142 MMHG | DIASTOLIC BLOOD PRESSURE: 61 MMHG

## 2019-02-19 VITALS — SYSTOLIC BLOOD PRESSURE: 125 MMHG | DIASTOLIC BLOOD PRESSURE: 76 MMHG

## 2019-02-19 LAB
25(OH)D3 SERPL-MCNC: 42.8 NG/ML (ref 30–100)
ALBUMIN SERPL-MCNC: 2.6 GM/DL (ref 3.1–4.5)
ALP SERPL-CCNC: 85 U/L (ref 45–117)
ALT SERPL W P-5'-P-CCNC: 17 U/L (ref 12–78)
AST SERPL-CCNC: 24 IU/L (ref 3–35)
BASOPHILS # BLD AUTO: 0 10*3/UL (ref 0–0.1)
BASOPHILS NFR BLD AUTO: 0.2 % (ref 0–1)
BUN SERPL-MCNC: 18 MG/DL (ref 7–24)
CHLORIDE SERPL-SCNC: 105 MMOL/L (ref 98–107)
CHOLEST SERPL-MCNC: 118 MG/DL (ref ?–200)
CREAT SERPL-MCNC: 1 MG/DL (ref 0.55–1.02)
EOSINOPHIL # BLD AUTO: 0 10*3/UL (ref 0–0.4)
EOSINOPHIL # BLD AUTO: 0.2 % (ref 1–4)
EPI CELLS #/AREA URNS HPF: (no result) /[HPF]
ERYTHROCYTE [DISTWIDTH] IN BLOOD BY AUTOMATED COUNT: 12.9 % (ref 0–14.5)
HCT VFR BLD AUTO: 35.8 % (ref 37–47)
HDLC SERPL-MCNC: 41 MG/DL (ref 40–60)
HGB BLD-MCNC: 12.4 G/DL (ref 12–16)
LDLC SERPL DIRECT ASSAY-MCNC: 52 MG/DL (ref 9–159)
LYMPHOCYTES # BLD AUTO: 0.4 10*3/UL (ref 1.3–4.4)
LYMPHOCYTES NFR BLD AUTO: 9.6 % (ref 27–41)
MCH RBC QN AUTO: 32.9 PG (ref 27–31)
MCHC RBC AUTO-ENTMCNC: 34.6 G/DL (ref 33–37)
MCV RBC AUTO: 95 FL (ref 81–99)
MONOCYTES # BLD AUTO: 0.1 10*3/UL (ref 0.1–1)
MONOCYTES NFR BLD MANUAL: 3.1 % (ref 3–9)
NEUT #: 3.6 10*3/UL (ref 2.3–7.9)
NEUT %: 86.4 % (ref 47–73)
NRBC BLD QL AUTO: 0 10*3/UL (ref 0–0)
PHOSPHATE SERPL-MCNC: 1.5 MG/DL (ref 2.5–4.9)
PLATELET # BLD AUTO: 77 10*3/UL (ref 130–400)
PMV BLD AUTO: 10 FL (ref 9.6–12.3)
POTASSIUM SERPL-SCNC: 3.7 MMOL/L (ref 3.5–5.1)
PROT SERPL-MCNC: 5.5 GM/DL (ref 6.4–8.2)
RBC # BLD AUTO: 3.77 10*6/UL (ref 4.1–5.1)
RBC #/AREA URNS HPF: (no result) RBC/HPF (ref 0–2)
SODIUM SERPL-SCNC: 137 MMOL/L (ref 136–145)
T4 FREE SERPL-MCNC: 1.08 NG/DL (ref 0.76–1.46)
TRIGL SERPL-MCNC: 123 MG/DL (ref ?–150)
TSH SERPL DL<=0.005 MIU/L-ACNC: 3.78 UIU/ML (ref 0.36–4.75)
VITAMIN B12: 511 PG/ML (ref 193–986)
VLDLC SERPL CALC-MCNC: 25 MG/DL (ref 6–40)
WBC #/AREA URNS HPF: (no result) WBC/HPF (ref 0–5)
WBC NRBC COR # BLD AUTO: 4.2 10*3/UL (ref 4.8–10.8)
YEAST #/AREA URNS HPF: (no result) /[HPF]

## 2019-02-19 PROCEDURE — 3E073KZ INTRODUCTION OF OTHER DIAGNOSTIC SUBSTANCE INTO CORONARY ARTERY, PERCUTANEOUS APPROACH: ICD-10-PCS

## 2019-02-19 PROCEDURE — 4A02XM4 MEASUREMENT OF CARDIAC TOTAL ACTIVITY, EXTERNAL APPROACH: ICD-10-PCS

## 2019-02-19 NOTE — NUR
ASSESSMENT COMPLETE. PT STATES SOME SHORTNESS OF BREATH AT THIS TIME HOWEVER
IS BETTER THAN WHEN SHE CAME IN ER NC AT 3 LITERS. PT PALE, WOUNDS NOTED TO
LEFT 2ND TOE AND PLANTAR SURFACE RIGHT 2ND TOE - PODIATRY CONSULTED, NO WOUND
ORDERS AT THIS TIME, NO DRAINAGE NOTED TO EITHER WOUND. BRUISE NOTED TO
BUTTOCKS AREA, NOT DTI - PT FELL AST HOME AND CAUSED BRUISE. NO OPEN AREAS
NOTED.
NEW IV STARTED IN
RIGHT FA #24, BRUISED AT INSERTION SITE HOWEVER, FLUSHES WELL AND GOOD BLOOD
RETURN, PT TOLERATED WELL.

## 2019-02-19 NOTE — NUR
INFORMED CONSENT SIGNED FOR LEXISCAN STRESS TEST WITH DR. JERNIGAN.  RESTING
EKG NSR, HR 88, /80.  PULSE OX 98% ON 3L/NC AND BREATH SOUNDS CLEAR, BUT
DEMINISHED BILATERALLY.   COMPLETED ONE MINUTE OF LEXISCAN PROTOCOL RECEIVING
LEXISCAN 0.4MG OVER 10 SECONDS.  NO ARRHYTHMIAS OR ST CHANGES SEEN.  PT C/O
SOB AND DIZZINESS.  LAST RECOVERY , /70.  WAITING NUCLEAR SCANNING
IN STABLE CONDITION.

## 2019-02-19 NOTE — NUR
PATIENT NPO, BUT MORNING MEDS ADMINISTERED WITH SMALL SIPS OF WATER FOR C/O
SOME MID EPIGASTRIC ABD DISCOMFORT AND NAUSEA. PATIENT STATES SHE KEEPS
"BELCHING UP WHITE STUFF" FROM HER CT EARLIER. PO REGLAN/PO PROTONIX GIVEN PER
ORDER.
PO NORCO ALSO ADMINISTERED FOR C/O PAIN IN BACK 7/10. WILL MONITOR
EFFECTIVENESS. CALL LIGHT LEFT IN REACH. BED ALARM INTACT.

## 2019-02-19 NOTE — NUR
PT REFUSED GLUCOSE FINGER STICK AT THIS TIME, BLOOD SUGAR TAKEN WHEN RETURN
FROM TESTING AND HAS NOT EATING, COVERED PREVIOUS BLOOD SUGAR RESULT

## 2019-02-19 NOTE — NUR
NOTIFIED DR. ROSALSE THAT HE ORDERED FOR BETADINE TO PATIENTS FOOT HOWEVER,
PATIENT HAS AN ALLERY TO IODINE. D/C WOUND CARE ORDER
OKAY TO CONTINUE WITH THERAHONEY TO RIGHT 2ND TOE AND LEFT 2ND TOE

## 2019-02-19 NOTE — NUR
EARLIER MEDICATION EFFECTIVE PER PT. WILL CONTINUE TO MONITOR. CALL LIGHT IN
REACH. BED ALARM INTACT.

## 2019-02-19 NOTE — NUR
case management attempts to visit with patient, patient having tesing done at
this time, will see at a later time

## 2019-02-19 NOTE — NUR
JOSE WHITMAN [id number]
 
Please refer to the physician's history and physical for past medical history,
comorbid conditions, and allergies.
   Diagnosis: CHEST PAIN
 
   Quinn Score: ,
 
WOUND DESCRIPTIONS:
Location of the wound: Right 2nd toe plantar aspect
Type of wound: unstageable
Thickness: Full
Size: 0.6cm x 0.5cm x <0.1cm
Tunneling: none
Undermining: none
Sinus Tract: none
Presence of Exudate: none
Amount: None
Color: Brown, red
Odor: None
Periwound Skin Appearance: Normal
Wound edges: closed
Pain (associated with wound): none at time of assessment
How does patient state this happened? pt stated she follows with Dr. Carr starting about 3 months ago but has had these areas for 6 months.
Patient states she wanted to follow with Dr. Pa but is unable to walk the
length of his office to be seen so thats why she is following with Dr. Carr. She stated that while she is inpatient she wants to follow Dr. Pa and when she is discharge she will continue care with Dr. Carr. She
stated they are cleaning the area and applying antibiotic ointment. She stated
that the areas haven't show any improvements with the current treatment.
 
Location of the wound: left 2nd toe
Type of wound: unstageable
Thickness: Full
Size: 0.4cm x 0.2cm x <0.1cm
Tunneling: none
Undermining: none
Sinus Tract: none
Presence of Exudate: none
Amount: None
Color: Brown, red
Odor: None
Periwound Skin Appearance: Normal
Wound edges: closed
Pain (associated with wound): none at time of assessment
How does patient state this happened? pt stated she follows with Dr. Carr starting about 3 months ago but has had these areas for 6 months.
Patient states she wanted to follow with Dr. Pa but is unable to walk the
length of his office to be seen so thats why she is following with Dr. Carr. She stated that while she is inpatient she wants to follow Dr. Pa and when she is discharge she will continue care with Dr. Carr.
She stated they are cleaning the area and applying antibiotic ointment. She
stated that the areas haven't show any improvements with the current
treatment.
 
Patient stated she fell a couple of days ago and she has ecchymotic areas
located to right and left upper buttocks. No open areas at time of assessment.
No redness surrounding areas at time of assessment.
   Surface the patient is resting on: Position Pro
 
SKIN PREVENTION RECOMMENDATION:
   1.  Pressure redistribution support surface as appropriate
   2.  Elevate heels
   3.  Remove boots/TEDS every shift and reapply
   4.  Head of bed 30 degrees as tolerated
   5.  Assess nutrition and hydration
   6.  Manage moisture
   7.  Avoid the use of containment devices while in bed
   8.  Use absorptive products on surfaces limit layers of linens on bed
   9.  Turn and reposition every 1-2 hours in bed and every 1 hour in chair as
       tolerated
   10. Weight shifts every 15 minutes while up in chair
   11. Offloading with pillows or device to keep heels elevated off bed
   12. Monitor skin at least every shift
   13. Inspect under medical devices twice a day
 
WOUND TREATMENT RECOMMENDATIONS:
Venous and arterial studies to BLE's.
Consult podiatry since patient wants to follow with them during her stay.
Unstageable guidelines: Cleanse right 2nd toe and left 2nd toe with nss and
apply sureprep around the wound therahoney to wound bed and cover with bandaid
daily.

## 2019-02-19 NOTE — NUR
EKG NOTIFIED OF STAT EKG ORDERED. ALSO DISCUSSED 3RD EKG DUE AT 0121. OKAY TO
GET ONE ASAP AND CANCEL OTHER AS IT IS A DUPLICATE.

## 2019-02-19 NOTE — NUR
in to talk to patient.
Patient states lives at home with family.
There are few steps in the home.
Physician: buch
Pharmacy: [facility]
[place] health services: none
Patient's level of ADLs: INDEPENDENT
Patient has working utilities: all working
DME: home oxygen cpcp and walker from [facility]
Follow-up physician's appointment after d/c: [facility]
Does patient want to access PORTAL?: no
Discharge plan discussed with patient, patient lives at home with family, she
states she is independent in adls and ambulation, patient has all of the
equipment she needs at home, patient will be going home when able. discussed
with her VNA and seh declines any services at this time, case management will
follow for any home needs.
 
MATT WILDER

## 2019-02-19 NOTE — NUR
Nursing screen received and chart reveiw completed. Patient reports to case
management that she is independent in all ADLs and mobility. No further OT
indicated a this time.Thank you.
Keerthi Morris OTR/l

## 2019-02-19 NOTE — NUR
CALLED RN AT THIS TIME QUESTIONING PATIENT'S HR AFTER FLUID
ADMINISTRATION. PATIENT HR -118 PER CM. PATIENT ON BIPAP AND IS STILL
SOMEWHAT TUGGING AT MASK. NEW ORDER RECEIVED FOR DIFLUCAN FOR TRACE YEAST
SHOWN IN UA. PER , IV ZOSYN ALSO TO BE ORDERED.

## 2019-02-20 VITALS — DIASTOLIC BLOOD PRESSURE: 65 MMHG | SYSTOLIC BLOOD PRESSURE: 138 MMHG

## 2019-02-20 VITALS — SYSTOLIC BLOOD PRESSURE: 155 MMHG | DIASTOLIC BLOOD PRESSURE: 66 MMHG

## 2019-02-20 VITALS — SYSTOLIC BLOOD PRESSURE: 144 MMHG | DIASTOLIC BLOOD PRESSURE: 73 MMHG

## 2019-02-20 VITALS — DIASTOLIC BLOOD PRESSURE: 63 MMHG

## 2019-02-20 LAB
BASOPHILS # BLD AUTO: 0 10*3/UL (ref 0–0.1)
BASOPHILS NFR BLD AUTO: 0 % (ref 0–1)
BUN SERPL-MCNC: 23 MG/DL (ref 7–24)
CHLORIDE SERPL-SCNC: 106 MMOL/L (ref 98–107)
CREAT SERPL-MCNC: 1.03 MG/DL (ref 0.55–1.02)
EOSINOPHIL # BLD AUTO: 0 % (ref 1–4)
EOSINOPHIL # BLD AUTO: 0 10*3/UL (ref 0–0.4)
ERYTHROCYTE [DISTWIDTH] IN BLOOD BY AUTOMATED COUNT: 12.8 % (ref 0–14.5)
HCT VFR BLD AUTO: 34.7 % (ref 37–47)
HGB BLD-MCNC: 11.6 G/DL (ref 12–16)
LYMPHOCYTES # BLD AUTO: 0.3 10*3/UL (ref 1.3–4.4)
LYMPHOCYTES NFR BLD AUTO: 8.8 % (ref 27–41)
MCH RBC QN AUTO: 31.8 PG (ref 27–31)
MCHC RBC AUTO-ENTMCNC: 33.4 G/DL (ref 33–37)
MCV RBC AUTO: 95.1 FL (ref 81–99)
MONOCYTES # BLD AUTO: 0.1 10*3/UL (ref 0.1–1)
MONOCYTES NFR BLD MANUAL: 2.8 % (ref 3–9)
NEUT #: 3.4 10*3/UL (ref 2.3–7.9)
NEUT %: 88.1 % (ref 47–73)
NRBC BLD QL AUTO: 0 10*3/UL (ref 0–0)
PHOSPHATE SERPL-MCNC: 3.4 MG/DL (ref 2.5–4.9)
PLATELET # BLD AUTO: 67 10*3/UL (ref 130–400)
PMV BLD AUTO: 10.5 FL (ref 9.6–12.3)
POTASSIUM SERPL-SCNC: 3.8 MMOL/L (ref 3.5–5.1)
RBC # BLD AUTO: 3.65 10*6/UL (ref 4.1–5.1)
SODIUM SERPL-SCNC: 138 MMOL/L (ref 136–145)
WBC NRBC COR # BLD AUTO: 3.9 10*3/UL (ref 4.8–10.8)

## 2019-02-20 NOTE — NUR
case management visits with patient, discussed with her VNA and patient
declined any services. case management will follow

## 2019-02-20 NOTE — NUR
PT ALERT PLEASANT COOPERATIVE WITH ALL INTERACTION. COMPLIANT WITH CALL LIGHT.
PT CONTINUES TO HAVE FREQUENT URINE AND BOWEL. EDEMA PRESENT ON BLE. PT HAS
SOME POSITIONAL INCONTINENCE.

## 2019-02-20 NOTE — NUR
DR QUINTERO ON UNIT TO SEE PT. PT BHUMI, WITH COMPLAINT OF SORE THROAT FROM
COUGHING. UP TO TOILET FREQUENTLY WITH STAFF ASSIST. DRESSING TO BILATERAL
TOES INTACT.

## 2019-02-21 VITALS — SYSTOLIC BLOOD PRESSURE: 157 MMHG | DIASTOLIC BLOOD PRESSURE: 75 MMHG

## 2019-02-21 VITALS — SYSTOLIC BLOOD PRESSURE: 149 MMHG | DIASTOLIC BLOOD PRESSURE: 69 MMHG

## 2019-02-21 VITALS — DIASTOLIC BLOOD PRESSURE: 74 MMHG | SYSTOLIC BLOOD PRESSURE: 144 MMHG

## 2019-02-21 VITALS — DIASTOLIC BLOOD PRESSURE: 84 MMHG

## 2019-02-21 LAB
BASOPHILS # BLD AUTO: 0 10*3/UL (ref 0–0.1)
BASOPHILS NFR BLD AUTO: 0 % (ref 0–1)
BUN SERPL-MCNC: 28 MG/DL (ref 7–24)
CHLORIDE SERPL-SCNC: 108 MMOL/L (ref 98–107)
CREAT SERPL-MCNC: 1.01 MG/DL (ref 0.55–1.02)
EOSINOPHIL # BLD AUTO: 0 % (ref 1–4)
EOSINOPHIL # BLD AUTO: 0 10*3/UL (ref 0–0.4)
ERYTHROCYTE [DISTWIDTH] IN BLOOD BY AUTOMATED COUNT: 12.9 % (ref 0–14.5)
HCT VFR BLD AUTO: 35.8 % (ref 37–47)
HGB BLD-MCNC: 12 G/DL (ref 12–16)
LYMPHOCYTES # BLD AUTO: 0.4 10*3/UL (ref 1.3–4.4)
LYMPHOCYTES NFR BLD AUTO: 7.7 % (ref 27–41)
MCH RBC QN AUTO: 32.3 PG (ref 27–31)
MCHC RBC AUTO-ENTMCNC: 33.5 G/DL (ref 33–37)
MCV RBC AUTO: 96.2 FL (ref 81–99)
MONOCYTES # BLD AUTO: 0.2 10*3/UL (ref 0.1–1)
MONOCYTES NFR BLD MANUAL: 3.8 % (ref 3–9)
NEUT #: 4 10*3/UL (ref 2.3–7.9)
NEUT %: 88.3 % (ref 47–73)
NRBC BLD QL AUTO: 0 10*3/UL (ref 0–0)
PLATELET # BLD AUTO: 74 10*3/UL (ref 130–400)
PMV BLD AUTO: 11.3 FL (ref 9.6–12.3)
POTASSIUM SERPL-SCNC: 3.6 MMOL/L (ref 3.5–5.1)
RBC # BLD AUTO: 3.72 10*6/UL (ref 4.1–5.1)
SODIUM SERPL-SCNC: 142 MMOL/L (ref 136–145)
WBC NRBC COR # BLD AUTO: 4.5 10*3/UL (ref 4.8–10.8)

## 2019-02-21 PROCEDURE — 5A09357 ASSISTANCE WITH RESPIRATORY VENTILATION, LESS THAN 24 CONSECUTIVE HOURS, CONTINUOUS POSITIVE AIRWAY PRESSURE: ICD-10-PCS | Performed by: INTERNAL MEDICINE

## 2019-02-21 NOTE — NUR
RESTING IN BED WITH VISITORS AT HER SIDE. RESP-EASY AND REGULAR OXYGEN IN USE.
CALL LIGHT IN REACH. BED ALARM ON. SEE SHIFT ASSESSMENT.

## 2019-02-21 NOTE — NUR
PT RESTING IN BED EATING BREAKFAST. NO C/O AT THIS TIME. PROD COUGH FOR YELLOW
PER PT. OXYGEN IN USE. CALL LIGHT IN REACH. SEE SHIFT ASSESSMENT.

## 2019-02-21 NOTE — NUR
PATIENT MEDICATED AS PER PRN ORDER WITH NORCO FOR C/O PAIN.  SEE EMAR.
REINFORCED USE OF CALL LIGHT.

## 2019-02-22 VITALS — DIASTOLIC BLOOD PRESSURE: 68 MMHG | SYSTOLIC BLOOD PRESSURE: 136 MMHG

## 2019-02-22 VITALS — DIASTOLIC BLOOD PRESSURE: 61 MMHG | SYSTOLIC BLOOD PRESSURE: 129 MMHG

## 2019-02-22 VITALS — DIASTOLIC BLOOD PRESSURE: 62 MMHG

## 2019-02-22 LAB
BASOPHILS # BLD AUTO: 0 10*3/UL (ref 0–0.1)
BASOPHILS NFR BLD AUTO: 0 % (ref 0–1)
BUN SERPL-MCNC: 31 MG/DL (ref 7–24)
CHLORIDE SERPL-SCNC: 110 MMOL/L (ref 98–107)
CREAT SERPL-MCNC: 0.88 MG/DL (ref 0.55–1.02)
EOSINOPHIL # BLD AUTO: 0 % (ref 1–4)
EOSINOPHIL # BLD AUTO: 0 10*3/UL (ref 0–0.4)
ERYTHROCYTE [DISTWIDTH] IN BLOOD BY AUTOMATED COUNT: 13.1 % (ref 0–14.5)
HCT VFR BLD AUTO: 36.8 % (ref 37–47)
HGB BLD-MCNC: 12.7 G/DL (ref 12–16)
LYMPHOCYTES # BLD AUTO: 0.4 10*3/UL (ref 1.3–4.4)
LYMPHOCYTES NFR BLD AUTO: 10.5 % (ref 27–41)
MCH RBC QN AUTO: 32.7 PG (ref 27–31)
MCHC RBC AUTO-ENTMCNC: 34.5 G/DL (ref 33–37)
MCV RBC AUTO: 94.8 FL (ref 81–99)
MONOCYTES # BLD AUTO: 0.2 10*3/UL (ref 0.1–1)
MONOCYTES NFR BLD MANUAL: 4.4 % (ref 3–9)
NEUT #: 3.1 10*3/UL (ref 2.3–7.9)
NEUT %: 84.8 % (ref 47–73)
NRBC BLD QL AUTO: 0 % (ref 0–0)
PLATELET # BLD AUTO: 68 10*3/UL (ref 130–400)
PMV BLD AUTO: 11.1 FL (ref 9.6–12.3)
POTASSIUM SERPL-SCNC: 3.6 MMOL/L (ref 3.5–5.1)
RBC # BLD AUTO: 3.88 10*6/UL (ref 4.1–5.1)
SODIUM SERPL-SCNC: 142 MMOL/L (ref 136–145)
WBC NRBC COR # BLD AUTO: 3.6 10*3/UL (ref 4.8–10.8)

## 2019-02-22 PROCEDURE — 5A09357 ASSISTANCE WITH RESPIRATORY VENTILATION, LESS THAN 24 CONSECUTIVE HOURS, CONTINUOUS POSITIVE AIRWAY PRESSURE: ICD-10-PCS | Performed by: INTERNAL MEDICINE

## 2019-02-22 NOTE — NUR
case management visits with patient, patient states she is probably going home
today. again educated her on the services of home health. patient declined any
home services

## 2019-02-22 NOTE — NUR
Discharge instructions reviewed with patient/family. Patient receptive and
verbalizes understanding. Follow-up care arranged. Written instructions given
to patient/family.
NIMA ESQUIVEL

## 2019-02-24 NOTE — NUR
PATIENT CALLED IN REGARDING [facility] NOT FILLING SCRIPT. DR ARECHIGA
PRESENT ON FLOOR AND DISCUSSED ISSUE. DR ARECHIGA IN CONTACT WITH PHARMACIST AT
[facility]. SCRIPT VERIFIED AND TO BE FILLED

## 2019-08-11 ENCOUNTER — HOSPITAL ENCOUNTER (EMERGENCY)
Dept: HOSPITAL 83 - ED | Age: 74
Discharge: HOME | End: 2019-08-11
Payer: MEDICARE

## 2019-08-11 VITALS — BODY MASS INDEX: 52.08 KG/M2 | HEIGHT: 61.97 IN | WEIGHT: 283 LBS

## 2019-08-11 VITALS — DIASTOLIC BLOOD PRESSURE: 65 MMHG

## 2019-08-11 DIAGNOSIS — Z91.041: ICD-10-CM

## 2019-08-11 DIAGNOSIS — Z79.82: ICD-10-CM

## 2019-08-11 DIAGNOSIS — Z87.440: ICD-10-CM

## 2019-08-11 DIAGNOSIS — R30.9: ICD-10-CM

## 2019-08-11 DIAGNOSIS — Z79.899: ICD-10-CM

## 2019-08-11 DIAGNOSIS — Z79.01: ICD-10-CM

## 2019-08-11 DIAGNOSIS — R30.0: Primary | ICD-10-CM

## 2019-08-11 DIAGNOSIS — R35.0: ICD-10-CM

## 2019-08-11 LAB
APPEARANCE UR: CLEAR
BACTERIA #/AREA URNS HPF: (no result) /[HPF]
BILIRUB UR QL STRIP: NEGATIVE
COLOR UR: YELLOW
GLUCOSE UR QL: NEGATIVE
HGB UR QL STRIP: NEGATIVE
KETONES UR QL STRIP: NEGATIVE
LEUKOCYTE ESTERASE UR QL STRIP: NEGATIVE
NITRITE UR QL STRIP: NEGATIVE
PH UR STRIP: 6 [PH] (ref 5–9)
SP GR UR: 1.01 (ref 1–1.03)
UROBILINOGEN UR STRIP-MCNC: 0.2 E.U./DL (ref 0.2–1)

## 2019-10-29 ENCOUNTER — HOSPITAL ENCOUNTER (INPATIENT)
Dept: HOSPITAL 83 - ED | Age: 74
LOS: 3 days | Discharge: HOME HEALTH | DRG: 871 | End: 2019-11-01
Attending: INTERNAL MEDICINE | Admitting: INTERNAL MEDICINE
Payer: MEDICARE

## 2019-10-29 VITALS — DIASTOLIC BLOOD PRESSURE: 76 MMHG | SYSTOLIC BLOOD PRESSURE: 148 MMHG

## 2019-10-29 VITALS — WEIGHT: 293 LBS | HEIGHT: 62.99 IN | BODY MASS INDEX: 51.91 KG/M2

## 2019-10-29 VITALS — SYSTOLIC BLOOD PRESSURE: 140 MMHG | DIASTOLIC BLOOD PRESSURE: 80 MMHG

## 2019-10-29 VITALS — DIASTOLIC BLOOD PRESSURE: 73 MMHG

## 2019-10-29 VITALS — DIASTOLIC BLOOD PRESSURE: 63 MMHG | SYSTOLIC BLOOD PRESSURE: 149 MMHG

## 2019-10-29 DIAGNOSIS — M19.90: ICD-10-CM

## 2019-10-29 DIAGNOSIS — Z79.4: ICD-10-CM

## 2019-10-29 DIAGNOSIS — E53.9: ICD-10-CM

## 2019-10-29 DIAGNOSIS — E03.9: ICD-10-CM

## 2019-10-29 DIAGNOSIS — Z91.041: ICD-10-CM

## 2019-10-29 DIAGNOSIS — N18.3: ICD-10-CM

## 2019-10-29 DIAGNOSIS — Z79.01: ICD-10-CM

## 2019-10-29 DIAGNOSIS — Z99.81: ICD-10-CM

## 2019-10-29 DIAGNOSIS — J18.9: ICD-10-CM

## 2019-10-29 DIAGNOSIS — Z91.018: ICD-10-CM

## 2019-10-29 DIAGNOSIS — R74.8: ICD-10-CM

## 2019-10-29 DIAGNOSIS — G47.33: ICD-10-CM

## 2019-10-29 DIAGNOSIS — I50.32: ICD-10-CM

## 2019-10-29 DIAGNOSIS — J96.11: ICD-10-CM

## 2019-10-29 DIAGNOSIS — Z86.718: ICD-10-CM

## 2019-10-29 DIAGNOSIS — A41.9: Primary | ICD-10-CM

## 2019-10-29 DIAGNOSIS — Z96.651: ICD-10-CM

## 2019-10-29 DIAGNOSIS — Z79.899: ICD-10-CM

## 2019-10-29 DIAGNOSIS — E55.9: ICD-10-CM

## 2019-10-29 DIAGNOSIS — Z90.710: ICD-10-CM

## 2019-10-29 DIAGNOSIS — R65.20: ICD-10-CM

## 2019-10-29 DIAGNOSIS — I13.0: ICD-10-CM

## 2019-10-29 DIAGNOSIS — D72.819: ICD-10-CM

## 2019-10-29 DIAGNOSIS — G89.4: ICD-10-CM

## 2019-10-29 DIAGNOSIS — E11.22: ICD-10-CM

## 2019-10-29 DIAGNOSIS — E77.8: ICD-10-CM

## 2019-10-29 DIAGNOSIS — E66.01: ICD-10-CM

## 2019-10-29 DIAGNOSIS — D50.9: ICD-10-CM

## 2019-10-29 DIAGNOSIS — K21.9: ICD-10-CM

## 2019-10-29 DIAGNOSIS — Z79.82: ICD-10-CM

## 2019-10-29 DIAGNOSIS — J44.1: ICD-10-CM

## 2019-10-29 DIAGNOSIS — D69.6: ICD-10-CM

## 2019-10-29 DIAGNOSIS — E11.65: ICD-10-CM

## 2019-10-29 DIAGNOSIS — E44.0: ICD-10-CM

## 2019-10-29 DIAGNOSIS — J44.0: ICD-10-CM

## 2019-10-29 DIAGNOSIS — Z84.89: ICD-10-CM

## 2019-10-29 DIAGNOSIS — Z90.49: ICD-10-CM

## 2019-10-29 DIAGNOSIS — Z82.49: ICD-10-CM

## 2019-10-29 LAB
ALBUMIN SERPL-MCNC: 3 GM/DL (ref 3.1–4.5)
ALP SERPL-CCNC: 124 U/L (ref 45–117)
ALT SERPL W P-5'-P-CCNC: 16 U/L (ref 12–78)
APPEARANCE UR: CLEAR
APTT PPP: 31.4 SECONDS (ref 20–32.1)
AST SERPL-CCNC: 21 IU/L (ref 3–35)
BASOPHILS # BLD AUTO: 0 10*3/UL (ref 0–0.1)
BASOPHILS NFR BLD AUTO: 0.6 % (ref 0–1)
BILIRUB UR QL STRIP: NEGATIVE
BUN SERPL-MCNC: 25 MG/DL (ref 7–24)
CASTS URNS QL MICRO: (no result)
CHLORIDE SERPL-SCNC: 105 MMOL/L (ref 98–107)
COLOR UR: (no result)
CREAT SERPL-MCNC: 1.42 MG/DL (ref 0.55–1.02)
EOSINOPHIL # BLD AUTO: 0.1 10*3/UL (ref 0–0.4)
EOSINOPHIL # BLD AUTO: 2.9 % (ref 1–4)
EPI CELLS #/AREA URNS HPF: (no result) /[HPF]
ERYTHROCYTE [DISTWIDTH] IN BLOOD BY AUTOMATED COUNT: 13.9 % (ref 0–14.5)
GLUCOSE UR QL: (no result)
HCT VFR BLD AUTO: 32.8 % (ref 37–47)
HGB BLD-MCNC: 10.4 G/DL (ref 12–16)
HGB UR QL STRIP: NEGATIVE
INR BLD: 1.4 (ref 2–3.5)
KETONES UR QL STRIP: NEGATIVE
LEUKOCYTE ESTERASE UR QL STRIP: NEGATIVE
LIPASE SERPL-CCNC: 153 U/L (ref 73–393)
LYMPHOCYTES # BLD AUTO: 0.8 10*3/UL (ref 1.3–4.4)
LYMPHOCYTES NFR BLD AUTO: 26.5 % (ref 27–41)
MCH RBC QN AUTO: 30.1 PG (ref 27–31)
MCHC RBC AUTO-ENTMCNC: 31.7 G/DL (ref 33–37)
MCV RBC AUTO: 95.1 FL (ref 81–99)
MONOCYTES # BLD AUTO: 0.2 10*3/UL (ref 0.1–1)
MONOCYTES NFR BLD MANUAL: 7.1 % (ref 3–9)
NEUT #: 1.9 10*3/UL (ref 2.3–7.9)
NEUT %: 62.6 % (ref 47–73)
NITRITE UR QL STRIP: NEGATIVE
NRBC BLD QL AUTO: 0 10*3/UL (ref 0–0)
PH UR STRIP: 7 [PH] (ref 5–9)
PLATELET # BLD AUTO: 103 10*3/UL (ref 130–400)
PMV BLD AUTO: 11.3 FL (ref 9.6–12.3)
POTASSIUM SERPL-SCNC: 4.1 MMOL/L (ref 3.5–5.1)
PROT SERPL-MCNC: 6.2 GM/DL (ref 6.4–8.2)
RBC # BLD AUTO: 3.45 10*6/UL (ref 4.1–5.1)
SODIUM SERPL-SCNC: 138 MMOL/L (ref 136–145)
SP GR UR: 1.01 (ref 1–1.03)
TROPONIN I SERPL-MCNC: < 0.015 NG/ML (ref ?–0.04)
UROBILINOGEN UR STRIP-MCNC: 0.2 E.U./DL (ref 0.2–1)
WBC NRBC COR # BLD AUTO: 3.1 10*3/UL (ref 4.8–10.8)

## 2019-10-29 NOTE — EKG
[place], [place]
 
                               ELECTROCARDIOGRAM REPORT
 
        NAME: JOSE WHITMAN                    ACCT #: [id number]  
        UNIT #: [id number]                        ROOM: 402       
        DOCTOR: JAMSHID DRAFT REPORT          BIRTHDATE: [birth year]
 
 
 

 
 
                           [facility]
                                       
Test Date:    2019-10-29               Test Time:    16:40:31
Pat Name:     JOSE WHITMAN              Department:   
Patient ID:   [id number]             Room:         402
Gender:       F                        Technician:   
:          1945               Requested By: FAISAL MUÑIZ
Order Number: [id number]      Reading MD:   Abdoulaye Ascencio MD
                                 Measurements
Intervals                              Axis          
Rate:         78                       P:            19
AR:           138                      QRS:          39
QRSD:         104                      T:            35
QT:           403                                    
QTc:          460                                    
                           Interpretive Statements
Sinus rhythm
Low voltage, precordial leads
Compared to ECG 2019 13:30:09
No significant changes
 
Electronically Signed On 10- 17:29:57 PDT by Abdoulaye Ascencio MD
 
CM:EKGRPT:ELECTROCARDIOGRAM REPORT
 
D: 10/29/19 1640
T: 10/30/19 1729
    
FAISAL UPTON DRAFT REPORT         
FAISAL MUÑIZ DO

## 2019-10-29 NOTE — NUR
A 74, admitted to , under the
services of PHOEBE Stewart DO with a diagnosis of CHF.
Chief complaint is SHORTNESS OF BREATH.
Patient arrived via stretcher from ER.
Monitor applied. Initial assessment completed.
Vital signs taken and recorded.
PHOEBE STEWART DO notified of admission to the unit.
Orders received.
See assessment for past medical history, medications
and allergies.
Patient and/or family oriented to unit. [facility]
visitation policy reviewed.
Clothing/patient valuable form completed.
 
EDIE CALDERA

## 2019-10-30 VITALS — SYSTOLIC BLOOD PRESSURE: 129 MMHG | DIASTOLIC BLOOD PRESSURE: 57 MMHG

## 2019-10-30 VITALS — SYSTOLIC BLOOD PRESSURE: 150 MMHG | DIASTOLIC BLOOD PRESSURE: 67 MMHG

## 2019-10-30 VITALS — SYSTOLIC BLOOD PRESSURE: 128 MMHG | DIASTOLIC BLOOD PRESSURE: 54 MMHG

## 2019-10-30 VITALS — DIASTOLIC BLOOD PRESSURE: 59 MMHG

## 2019-10-30 VITALS — DIASTOLIC BLOOD PRESSURE: 53 MMHG

## 2019-10-30 LAB
BASOPHILS # BLD AUTO: 1 % (ref 0–1)
BUN SERPL-MCNC: 26 MG/DL (ref 7–24)
CHLORIDE SERPL-SCNC: 106 MMOL/L (ref 98–107)
CREAT SERPL-MCNC: 1.26 MG/DL (ref 0.55–1.02)
ERYTHROCYTE [DISTWIDTH] IN BLOOD BY AUTOMATED COUNT: 13.8 % (ref 0–14.5)
HCT VFR BLD AUTO: 30 % (ref 37–47)
HGB BLD-MCNC: 9.6 G/DL (ref 12–16)
MCH RBC QN AUTO: 30 PG (ref 27–31)
MCHC RBC AUTO-ENTMCNC: 32 G/DL (ref 33–37)
MCV RBC AUTO: 93.8 FL (ref 81–99)
NRBC BLD QL AUTO: 0 10*3/UL (ref 0–0)
PHOSPHATE SERPL-MCNC: 2.3 MG/DL (ref 2.5–4.9)
PLATELET # BLD AUTO: 80 10*3/UL (ref 130–400)
PLATELET SUFFICIENCY: (no result)
PMV BLD AUTO: 11.3 FL (ref 9.6–12.3)
POTASSIUM SERPL-SCNC: 4.1 MMOL/L (ref 3.5–5.1)
RBC # BLD AUTO: 3.2 10*6/UL (ref 4.1–5.1)
SODIUM SERPL-SCNC: 139 MMOL/L (ref 136–145)
TOTAL CELLS COUNTED: 100 #CELLS
WBC NRBC COR # BLD AUTO: 1.9 10*3/UL (ref 4.8–10.8)

## 2019-10-30 NOTE — NUR
PHYSICAL THERAPY
 
 Patient has a pending Ultrasound for clearance of DVT. Therapy evaluation
to be placed on hold  until Patient has been medically cleared. Thank you
 
 Roma Moura, PT, DPT

## 2019-10-30 NOTE — NUR
Occupational therapy orders received and chart reviewed. Patient decling OT
evaluation and POC at this time secondary to having a "bad headache." Patient
stated her nurse gave her medicine earlier and she wanted to "close my eyes."
Will follow up with patient tomorrow for completion of OT eval and POC.
 
Thank you.
 
Leana Andrew, OTR/L

## 2019-10-30 NOTE — NUR
PATIENT REQUESTING TYLENOL FOR GENERALIZED PAIN, ADMINISTERED AS PRESCRIBED.
WILL MONITOR FOR EFFECTIVENESS.

## 2019-10-30 NOTE — NUR
in to talk to patient.
Patient states lives at home with granddaughter.
There are fe steps in the home.
Physician: nisha
Pharmacy: [facility]
[place] health services: [facility] health, nursing, physical therapy
Patient's level of ADLs: MINIMAL ASSIST
Patient has working utilities: all working
DME: walker, home oxygen, portable tanks, cpap from [facility]
Follow-up physician's appointment after d/c: will be made by hospitalist nurse
director upon discharge
Does patient want to access PORTAL?: no
Discharge plan discussed with patient, she states she lives at home with
granddaughter, she uses a walker for ambulation, requires minimal assistance
with adls, she has home oxygen, and portable tanks from [facility], discussed with
her a discharge plan including a short term skilled nursing for rehab due to
decreased strenght, patient declined, stated she had been to a SNF in the past
and didn't feel she needed to return at this time, also discussed with her VNA
and she stated he currently has [facility] health, nursing and pt and would
like to them to reutrn. case management will notify [facility] when
patient is medically stable for discharge.
 
AMTT WILDER

## 2019-10-30 NOTE — NUR
DR. MCKINLEY ON FLOOR AND NOTIFIED OF PT'S LACTIC ACID OF 3.2.  V.O. RCVD TO
ADMINISTER AN ADDITIONAL 2L BOLUS.

## 2019-10-30 NOTE — NUR
Occupational therapy orders on hold at this time until results of lower
extremity ultrasound. Will follow up with patient when appropriate. Thank you.
 
 
Leana Andrew OTR/L

## 2019-10-30 NOTE — NUR
PT C/O LEFT SIDE CHEST PAIN 7/10.  POSTERIOR WHEEZING AUSCULTATED.  PT C/O
DYSPNEA AT REST W/CPAP ON.  MEDICATED W/TYLENOL.  DR. MCKINLEY NOTIFIED.  IVF
STOPPED AND D/C'D.  WILL CONTINUE TO MONITOR.

## 2019-10-30 NOTE — NUR
PHYSICAL THERAPY
 
Pt refuse to participate due to having a headache. Thank you.
 
Roma alejandro, PT, DPT

## 2019-10-31 VITALS — DIASTOLIC BLOOD PRESSURE: 65 MMHG | SYSTOLIC BLOOD PRESSURE: 148 MMHG

## 2019-10-31 VITALS — SYSTOLIC BLOOD PRESSURE: 133 MMHG | DIASTOLIC BLOOD PRESSURE: 57 MMHG

## 2019-10-31 VITALS — DIASTOLIC BLOOD PRESSURE: 59 MMHG

## 2019-10-31 VITALS — DIASTOLIC BLOOD PRESSURE: 61 MMHG

## 2019-10-31 VITALS — DIASTOLIC BLOOD PRESSURE: 50 MMHG | SYSTOLIC BLOOD PRESSURE: 124 MMHG

## 2019-10-31 LAB
BASOPHILS # BLD AUTO: 0 10*3/UL (ref 0–0.1)
BASOPHILS NFR BLD AUTO: 0 % (ref 0–1)
BUN SERPL-MCNC: 25 MG/DL (ref 7–24)
CHLORIDE SERPL-SCNC: 108 MMOL/L (ref 98–107)
CREAT SERPL-MCNC: 1.19 MG/DL (ref 0.55–1.02)
EOSINOPHIL # BLD AUTO: 0 % (ref 1–4)
EOSINOPHIL # BLD AUTO: 0 10*3/UL (ref 0–0.4)
ERYTHROCYTE [DISTWIDTH] IN BLOOD BY AUTOMATED COUNT: 13.6 % (ref 0–14.5)
HCT VFR BLD AUTO: 29.5 % (ref 37–47)
HGB BLD-MCNC: 9.4 G/DL (ref 12–16)
LYMPHOCYTES # BLD AUTO: 0.4 10*3/UL (ref 1.3–4.4)
LYMPHOCYTES NFR BLD AUTO: 9.3 % (ref 27–41)
MCH RBC QN AUTO: 29.7 PG (ref 27–31)
MCHC RBC AUTO-ENTMCNC: 31.9 G/DL (ref 33–37)
MCV RBC AUTO: 93.1 FL (ref 81–99)
MONOCYTES # BLD AUTO: 0.2 10*3/UL (ref 0.1–1)
MONOCYTES NFR BLD MANUAL: 4.7 % (ref 3–9)
NEUT #: 4 10*3/UL (ref 2.3–7.9)
NEUT %: 85.8 % (ref 47–73)
NRBC BLD QL AUTO: 0 10*3/UL (ref 0–0)
PHOSPHATE SERPL-MCNC: 2.9 MG/DL (ref 2.5–4.9)
PLATELET # BLD AUTO: 91 10*3/UL (ref 130–400)
PMV BLD AUTO: 11.4 FL (ref 9.6–12.3)
POTASSIUM SERPL-SCNC: 3.9 MMOL/L (ref 3.5–5.1)
RBC # BLD AUTO: 3.17 10*6/UL (ref 4.1–5.1)
SODIUM SERPL-SCNC: 139 MMOL/L (ref 136–145)
WBC NRBC COR # BLD AUTO: 4.6 10*3/UL (ref 4.8–10.8)

## 2019-10-31 PROCEDURE — 5A09357 ASSISTANCE WITH RESPIRATORY VENTILATION, LESS THAN 24 CONSECUTIVE HOURS, CONTINUOUS POSITIVE AIRWAY PRESSURE: ICD-10-PCS | Performed by: INTERNAL MEDICINE

## 2019-10-31 NOTE — NUR
PATIENT RESING WITH EYES CLOSED. RESPIRATIONS EASY AND UNLABORED ON HOME CPAP
MACHINE. BED LOCKED IN LOWEST POSITION, CALL LIGHT WITHIN REACH. WILL MONITOR.

## 2019-10-31 NOTE — NUR
PATIENT REQUESTING MEDICATION FOR HEADACHE. TYLENOL ADMINISTERED AS
PRESCRIBED. WILL MONITOR FOR EFFECTIVENESS.

## 2019-10-31 NOTE — NUR
IV started right forearm with #22 protective cath after 1  attempts.
Site prepped with Chloroprep.
Sterile dressing applied. Patient tolerated procedure well.
 
KALEB MORENO

## 2019-10-31 NOTE — NUR
PATIENT RESTING WITH EYES CLOSED. RESPIRATIONS EASY AND UNLABORED ON HOME CPAP
UNIT. CALL LIGHT WITHIN REACH. WILL MONITOR.

## 2019-10-31 NOTE — NUR
Occupational Therapy evaluation completed on 4 with full eval to
follow.Precautions include O2 use, wh walker use, BLE edema,IV UE,low
complexity level 88280. Patient is MI with ADLs and wheeled walker at home w/
granddaughter who assists with IADLS.  Recommend no further OT at this time
and return home upon d/c. Thank you for this referral.
Shakira Morris OTR/l

## 2019-10-31 NOTE — NUR
case management visits with patient, again discussed with her a short term
skilled nursing for rehab and she declines a SNF, she is agreeable to
continuing her [facility] home health, case management will send a renew to [facility] when patient is medically stable for discharge

## 2019-10-31 NOTE — NUR
PHYSICAL THERAPY
 
physical therapy evaluation completed, 4E. Full details to follow. Low
complexity determined after evaluation/chart review, 13144. Pt is safe and
Supervision/modified independent with all functional mobility using a FWW. No
PT needs at this time. Thank you
 
Roma Moura, PT, DPT

## 2019-11-01 VITALS — DIASTOLIC BLOOD PRESSURE: 54 MMHG

## 2019-11-01 VITALS — DIASTOLIC BLOOD PRESSURE: 64 MMHG

## 2019-11-01 LAB
BASOPHILS # BLD AUTO: 0 10*3/UL (ref 0–0.1)
BASOPHILS NFR BLD AUTO: 0 % (ref 0–1)
BUN SERPL-MCNC: 29 MG/DL (ref 7–24)
CHLORIDE SERPL-SCNC: 107 MMOL/L (ref 98–107)
CREAT SERPL-MCNC: 1.17 MG/DL (ref 0.55–1.02)
EOSINOPHIL # BLD AUTO: 0 % (ref 1–4)
EOSINOPHIL # BLD AUTO: 0 10*3/UL (ref 0–0.4)
ERYTHROCYTE [DISTWIDTH] IN BLOOD BY AUTOMATED COUNT: 13.6 % (ref 0–14.5)
HCT VFR BLD AUTO: 29 % (ref 37–47)
HGB BLD-MCNC: 9.3 G/DL (ref 12–16)
LYMPHOCYTES # BLD AUTO: 0.3 10*3/UL (ref 1.3–4.4)
LYMPHOCYTES NFR BLD AUTO: 8.6 % (ref 27–41)
MCH RBC QN AUTO: 29.9 PG (ref 27–31)
MCHC RBC AUTO-ENTMCNC: 32.1 G/DL (ref 33–37)
MCV RBC AUTO: 93.2 FL (ref 81–99)
MONOCYTES # BLD AUTO: 0.1 10*3/UL (ref 0.1–1)
MONOCYTES NFR BLD MANUAL: 2.8 % (ref 3–9)
NEUT #: 2.9 10*3/UL (ref 2.3–7.9)
NEUT %: 87.7 % (ref 47–73)
NRBC BLD QL AUTO: 0 % (ref 0–0)
PLATELET # BLD AUTO: 69 10*3/UL (ref 130–400)
PMV BLD AUTO: 11.8 FL (ref 9.6–12.3)
POTASSIUM SERPL-SCNC: 4.4 MMOL/L (ref 3.5–5.1)
RBC # BLD AUTO: 3.11 10*6/UL (ref 4.1–5.1)
SODIUM SERPL-SCNC: 137 MMOL/L (ref 136–145)
WBC NRBC COR # BLD AUTO: 3.3 10*3/UL (ref 4.8–10.8)

## 2019-11-01 PROCEDURE — 5A09357 ASSISTANCE WITH RESPIRATORY VENTILATION, LESS THAN 24 CONSECUTIVE HOURS, CONTINUOUS POSITIVE AIRWAY PRESSURE: ICD-10-PCS | Performed by: INTERNAL MEDICINE

## 2019-11-01 NOTE — NUR
Discharge instructions reviewed with patient/family. Patient receptive and
verbalizes understanding. Follow-up care arranged. Written instructions given
to patient/granddaughter. Discharged via wheelchair in care of family. Pt had
portable o2 tank for dc.
RUPALI GUSTAFSON

## 2019-11-01 NOTE — NUR
I spoke with pt daughter Alex Portillo at this time at pt request to notify
her of pt dc. Notified her that paperwork is done and I would go over it with
her and pt when she arrives. Statess she will be here in about 45 minutes.

## 2019-11-01 NOTE — NUR
case management visits with patient, she states she is being discharged to
home today. case management will notify [facility] that patient is
being discharged

## 2019-11-08 ENCOUNTER — HOSPITAL ENCOUNTER (OUTPATIENT)
Dept: HOSPITAL 83 - RESCLI | Age: 74
Discharge: HOME | End: 2019-11-08
Attending: INTERNAL MEDICINE
Payer: MEDICARE

## 2019-11-08 DIAGNOSIS — I50.33: ICD-10-CM

## 2019-11-08 DIAGNOSIS — E66.01: ICD-10-CM

## 2019-11-08 DIAGNOSIS — E11.22: ICD-10-CM

## 2019-11-08 DIAGNOSIS — E55.9: ICD-10-CM

## 2019-11-08 DIAGNOSIS — R11.0: ICD-10-CM

## 2019-11-08 DIAGNOSIS — M15.8: ICD-10-CM

## 2019-11-08 DIAGNOSIS — K21.9: ICD-10-CM

## 2019-11-08 DIAGNOSIS — J44.1: ICD-10-CM

## 2019-11-08 DIAGNOSIS — I13.0: Primary | ICD-10-CM

## 2019-11-08 DIAGNOSIS — Z79.899: ICD-10-CM

## 2019-11-08 DIAGNOSIS — E03.9: ICD-10-CM

## 2019-11-08 DIAGNOSIS — N18.3: ICD-10-CM

## 2019-11-08 DIAGNOSIS — F51.01: ICD-10-CM

## 2019-11-08 DIAGNOSIS — Z86.718: ICD-10-CM

## 2019-11-08 DIAGNOSIS — G47.33: ICD-10-CM

## 2019-11-10 ENCOUNTER — HOSPITAL ENCOUNTER (INPATIENT)
Dept: HOSPITAL 83 - ED | Age: 74
LOS: 4 days | Discharge: HOME | DRG: 190 | End: 2019-11-14
Attending: EMERGENCY MEDICINE | Admitting: EMERGENCY MEDICINE
Payer: MEDICARE

## 2019-11-10 VITALS — HEIGHT: 62.99 IN | WEIGHT: 281.13 LBS | BODY MASS INDEX: 49.81 KG/M2

## 2019-11-10 VITALS — DIASTOLIC BLOOD PRESSURE: 65 MMHG

## 2019-11-10 VITALS — DIASTOLIC BLOOD PRESSURE: 65 MMHG | SYSTOLIC BLOOD PRESSURE: 141 MMHG

## 2019-11-10 VITALS — DIASTOLIC BLOOD PRESSURE: 78 MMHG | SYSTOLIC BLOOD PRESSURE: 151 MMHG

## 2019-11-10 VITALS — DIASTOLIC BLOOD PRESSURE: 64 MMHG

## 2019-11-10 VITALS — DIASTOLIC BLOOD PRESSURE: 77 MMHG

## 2019-11-10 DIAGNOSIS — Z90.49: ICD-10-CM

## 2019-11-10 DIAGNOSIS — Z82.49: ICD-10-CM

## 2019-11-10 DIAGNOSIS — G47.33: ICD-10-CM

## 2019-11-10 DIAGNOSIS — N17.0: ICD-10-CM

## 2019-11-10 DIAGNOSIS — Z84.89: ICD-10-CM

## 2019-11-10 DIAGNOSIS — I50.32: ICD-10-CM

## 2019-11-10 DIAGNOSIS — E03.9: ICD-10-CM

## 2019-11-10 DIAGNOSIS — I82.502: ICD-10-CM

## 2019-11-10 DIAGNOSIS — E87.2: ICD-10-CM

## 2019-11-10 DIAGNOSIS — G89.4: ICD-10-CM

## 2019-11-10 DIAGNOSIS — K21.9: ICD-10-CM

## 2019-11-10 DIAGNOSIS — E11.65: ICD-10-CM

## 2019-11-10 DIAGNOSIS — J44.1: Primary | ICD-10-CM

## 2019-11-10 DIAGNOSIS — Z99.81: ICD-10-CM

## 2019-11-10 DIAGNOSIS — Z91.041: ICD-10-CM

## 2019-11-10 DIAGNOSIS — E11.22: ICD-10-CM

## 2019-11-10 DIAGNOSIS — Z79.4: ICD-10-CM

## 2019-11-10 DIAGNOSIS — Z79.899: ICD-10-CM

## 2019-11-10 DIAGNOSIS — Z91.018: ICD-10-CM

## 2019-11-10 DIAGNOSIS — Z87.01: ICD-10-CM

## 2019-11-10 DIAGNOSIS — D64.9: ICD-10-CM

## 2019-11-10 DIAGNOSIS — N18.3: ICD-10-CM

## 2019-11-10 DIAGNOSIS — Z83.3: ICD-10-CM

## 2019-11-10 DIAGNOSIS — Z91.013: ICD-10-CM

## 2019-11-10 DIAGNOSIS — Z96.651: ICD-10-CM

## 2019-11-10 DIAGNOSIS — M19.90: ICD-10-CM

## 2019-11-10 DIAGNOSIS — Z90.710: ICD-10-CM

## 2019-11-10 DIAGNOSIS — I13.0: ICD-10-CM

## 2019-11-10 DIAGNOSIS — Z79.01: ICD-10-CM

## 2019-11-10 DIAGNOSIS — E66.01: ICD-10-CM

## 2019-11-10 DIAGNOSIS — J96.11: ICD-10-CM

## 2019-11-10 DIAGNOSIS — Z79.82: ICD-10-CM

## 2019-11-10 DIAGNOSIS — E44.0: ICD-10-CM

## 2019-11-10 DIAGNOSIS — Z82.3: ICD-10-CM

## 2019-11-10 DIAGNOSIS — R09.1: ICD-10-CM

## 2019-11-10 LAB
ALBUMIN SERPL-MCNC: 3.1 GM/DL (ref 3.1–4.5)
ALP SERPL-CCNC: 125 U/L (ref 45–117)
ALT SERPL W P-5'-P-CCNC: 23 U/L (ref 12–78)
APPEARANCE UR: CLEAR
APTT PPP: 30.8 SECONDS (ref 20–32.1)
AST SERPL-CCNC: 21 IU/L (ref 3–35)
BACTERIA #/AREA URNS HPF: (no result) /[HPF]
BASOPHILS # BLD AUTO: 0 10*3/UL (ref 0–0.1)
BASOPHILS NFR BLD AUTO: 0.2 % (ref 0–1)
BILIRUB UR QL STRIP: NEGATIVE
BUN SERPL-MCNC: 31 MG/DL (ref 7–24)
CHLORIDE SERPL-SCNC: 104 MMOL/L (ref 98–107)
COLOR UR: YELLOW
CREAT SERPL-MCNC: 1.41 MG/DL (ref 0.55–1.02)
EOSINOPHIL # BLD AUTO: 0 10*3/UL (ref 0–0.4)
EOSINOPHIL # BLD AUTO: 0.3 % (ref 1–4)
ERYTHROCYTE [DISTWIDTH] IN BLOOD BY AUTOMATED COUNT: 14.4 % (ref 0–14.5)
GLUCOSE UR QL: (no result)
HCT VFR BLD AUTO: 33.1 % (ref 37–47)
HGB BLD-MCNC: 10.8 G/DL (ref 12–16)
HGB UR QL STRIP: (no result)
INR BLD: 1.4 (ref 2–3.5)
KETONES UR QL STRIP: NEGATIVE
LEUKOCYTE ESTERASE UR QL STRIP: (no result)
LYMPHOCYTES # BLD AUTO: 0.5 10*3/UL (ref 1.3–4.4)
LYMPHOCYTES NFR BLD AUTO: 7 % (ref 27–41)
MCH RBC QN AUTO: 30.2 PG (ref 27–31)
MCHC RBC AUTO-ENTMCNC: 32.6 G/DL (ref 33–37)
MCV RBC AUTO: 92.5 FL (ref 81–99)
MONOCYTES # BLD AUTO: 0.2 10*3/UL (ref 0.1–1)
MONOCYTES NFR BLD MANUAL: 2.5 % (ref 3–9)
NEUT #: 5.8 10*3/UL (ref 2.3–7.9)
NEUT %: 89.5 % (ref 47–73)
NITRITE UR QL STRIP: NEGATIVE
NRBC BLD QL AUTO: 0 % (ref 0–0)
PH UR STRIP: 7 [PH] (ref 5–9)
PLATELET # BLD AUTO: 78 10*3/UL (ref 130–400)
PMV BLD AUTO: 11.7 FL (ref 9.6–12.3)
POTASSIUM SERPL-SCNC: 4.5 MMOL/L (ref 3.5–5.1)
PROT SERPL-MCNC: 5.9 GM/DL (ref 6.4–8.2)
RBC # BLD AUTO: 3.58 10*6/UL (ref 4.1–5.1)
RBC #/AREA URNS HPF: (no result) RBC/HPF (ref 0–2)
SODIUM SERPL-SCNC: 136 MMOL/L (ref 136–145)
SP GR UR: 1.01 (ref 1–1.03)
TROPONIN I SERPL-MCNC: 0.41 NG/ML (ref ?–0.04)
UROBILINOGEN UR STRIP-MCNC: 0.2 E.U./DL (ref 0.2–1)
WBC #/AREA URNS HPF: (no result) WBC/HPF (ref 0–5)
WBC NRBC COR # BLD AUTO: 6.5 10*3/UL (ref 4.8–10.8)
YEAST #/AREA URNS HPF: (no result) /[HPF]

## 2019-11-10 NOTE — NUR
REPORT FROM HERSON TOUSSAINT ASSUMED CARE OF PT, PT RESTING CURRENTLY, COMPLAINS OF
CHEST PAIN RATED 6/10 , WILL CONTINUE NTG AS ORDERED, SEE EMAR, CALL ROBLERO IN
REACH, WILL CONTINUE TO MONITOR PT

## 2019-11-10 NOTE — NUR
AWAITING FOR LOVENOX FROM NURSING SUPERVISOR, PT RESTING QUIETLY, WITH FAMILY
AT BEDSIDE, AWAITING BED ASSIGNMENT FOR ADMIT, PAIN IMPROVED , CALL BELL IN
REACH

## 2019-11-11 VITALS — DIASTOLIC BLOOD PRESSURE: 61 MMHG

## 2019-11-11 VITALS — DIASTOLIC BLOOD PRESSURE: 91 MMHG | SYSTOLIC BLOOD PRESSURE: 159 MMHG

## 2019-11-11 VITALS — DIASTOLIC BLOOD PRESSURE: 52 MMHG

## 2019-11-11 VITALS — DIASTOLIC BLOOD PRESSURE: 76 MMHG

## 2019-11-11 VITALS — DIASTOLIC BLOOD PRESSURE: 51 MMHG | SYSTOLIC BLOOD PRESSURE: 116 MMHG

## 2019-11-11 LAB
BASOPHILS # BLD AUTO: 0 10*3/UL (ref 0–0.1)
BASOPHILS NFR BLD AUTO: 0.2 % (ref 0–1)
BUN SERPL-MCNC: 34 MG/DL (ref 7–24)
CHLORIDE SERPL-SCNC: 102 MMOL/L (ref 98–107)
CREAT SERPL-MCNC: 1.78 MG/DL (ref 0.55–1.02)
EOSINOPHIL # BLD AUTO: 0 10*3/UL (ref 0–0.4)
EOSINOPHIL # BLD AUTO: 0.2 % (ref 1–4)
ERYTHROCYTE [DISTWIDTH] IN BLOOD BY AUTOMATED COUNT: 14.6 % (ref 0–14.5)
HCT VFR BLD AUTO: 31.5 % (ref 37–47)
HGB BLD-MCNC: 10.2 G/DL (ref 12–16)
LYMPHOCYTES # BLD AUTO: 0.7 10*3/UL (ref 1.3–4.4)
LYMPHOCYTES NFR BLD AUTO: 10.7 % (ref 27–41)
MCH RBC QN AUTO: 29.4 PG (ref 27–31)
MCHC RBC AUTO-ENTMCNC: 32.4 G/DL (ref 33–37)
MCV RBC AUTO: 90.8 FL (ref 81–99)
MONOCYTES # BLD AUTO: 0.3 10*3/UL (ref 0.1–1)
MONOCYTES NFR BLD MANUAL: 4.4 % (ref 3–9)
NEUT #: 5.6 10*3/UL (ref 2.3–7.9)
NEUT %: 83.9 % (ref 47–73)
NRBC BLD QL AUTO: 0 10*3/UL (ref 0–0)
PHOSPHATE SERPL-MCNC: 2.4 MG/DL (ref 2.5–4.9)
PLATELET # BLD AUTO: 99 10*3/UL (ref 130–400)
PMV BLD AUTO: 11.1 FL (ref 9.6–12.3)
POTASSIUM SERPL-SCNC: 3.9 MMOL/L (ref 3.5–5.1)
RBC # BLD AUTO: 3.47 10*6/UL (ref 4.1–5.1)
SODIUM SERPL-SCNC: 136 MMOL/L (ref 136–145)
WBC NRBC COR # BLD AUTO: 6.7 10*3/UL (ref 4.8–10.8)

## 2019-11-11 PROCEDURE — 5A09357 ASSISTANCE WITH RESPIRATORY VENTILATION, LESS THAN 24 CONSECUTIVE HOURS, CONTINUOUS POSITIVE AIRWAY PRESSURE: ICD-10-PCS | Performed by: EMERGENCY MEDICINE

## 2019-11-11 NOTE — NUR
PT RESTING IN BED WITH HOB ELEVATED. RESP-EASY AND REGULAR. OXYGEN IN USE. NO
C/O AT THIS TIME. CALL LIGHT IN REACH. SEE SHIFT ASSESSMENT.

## 2019-11-11 NOTE — NUR
SPOKE WITH DR MCKINLEY REGARDING PLATELET COUNT AND LOVENOX. STATES THE PATIENT IS
OK TO HAVE THE LOVENOX

## 2019-11-11 NOTE — NUR
in to talk to patient.
Patient states lives at HOME with GRAND DAUGHTER.
There are NO steps in the home.
Physician: [facility]: [facility]
Home health services: NONE HAD [facility] BUT THEY ARE DONE,
Patient's level of ADLs: MINIMAL ASSIST
Patient has working utilities: YES
DME: WALKER, CPAP AND O2, COMPANY [email or web address]
Follow-up physician's appointment after d/c: WILL BE MADE BY HOSPITALIST NURSE
DIRECTOR ON DISCHARGE
Does patient want to access PORTAL?: NO
Discharge plan PT LIVES AT HOME WITH HER GRAND DAUGHTER.  DENIES SHE WILL HAVE
NEEDS ON DISCHARGE.  TALKED WITH PT ABOUT GETTING HOME HEALTH REESTABLISHED BUT
SHE REFUSES.  HAS HOME O2 AND CPAP, WALKS WITH A WALKER.  WILL CONTINUE TO
FOLLOW.  STATES SHE HAS A RIDE HOME..
 
LONG,TRISTON

## 2019-11-11 NOTE — NUR
PT ADMITTED TO [facility] WITH RN IN NO ACUTE DISTRESS, SR ON TELE 2L/NC, SALINE
LOCK PATENT WITH NO REDNESS

## 2019-11-11 NOTE — NUR
SPOKE WITH DR. RAYA. PT NPO AFTER MIDNIGHT FOR TEST IN AM. ALSO HOLD XARELTO
MADE AWARE PT HAD XARELTO FOR TODAY. HE STATES HOLD ON IV HEPARIN SINCE SHE
HAD XARELTO.

## 2019-11-11 NOTE — NUR
Nursing screen received and chart reviewed. Patient admitted with chest pain
from home w/ granddaughter. Patient declines home health upon d/c and receives
minimal assist in ADLs.  No OT indicated at this time. SHould patient have a
decline in ADLs and safety in mobility then please refer to OT. Thank you.
Keerthi Morris OTR/l

## 2019-11-11 NOTE — NUR
A 74, admitted to , under the
services of FAISAL Albert DO with a diagnosis of CHEST PAIN.
Chief complaint is SHORTNESS OF BREATH.
Patient arrived via stretcher from ER.
Monitor applied. Initial assessment completed.
Vital signs taken and recorded.
FAISAL ALBERT DO notified of admission to the unit.
Orders received.
See assessment for past medical history, medications
and allergies.
Patient and/or family oriented to unit. [facility]
visitation policy reviewed.
Clothing/patient valuable form completed.
 
JOSE NARAYANAN

## 2019-11-11 NOTE — NUR
RESTING IN BED. RESP-EASY AND REGULAR. OXYGEN IN USE. VISITORS AT HER SIDE. NO
C/O AT THIS TIME. CALL LIGHT IN REACH. SEE SHIFT ASSESSMENT.

## 2019-11-11 NOTE — NUR
PT C/O CHEST HEAVINESS, RATES PAIN 5 ON PAIN SCALE 0-10. PT NONPROD COUGH
NOTED AND C/O HEADACHE. MEDICATED WITH NORCO PO PER PRN ORDER, SEE EMAR. CALL
LIGHT IN REACH. OXYGEN IN USE.

## 2019-11-11 NOTE — NUR
CALLED SVEN RICHTER REGARDING PT C/O CHEST HEAVINESS AND SHORTNESS OF BREATH
WHILE BATHING PT. SHE STATES OK FOR MORPHINE AND SHE WILL BE UP TO SEE PT.
OXYGEN IN USE.

## 2019-11-12 VITALS — SYSTOLIC BLOOD PRESSURE: 121 MMHG | DIASTOLIC BLOOD PRESSURE: 48 MMHG

## 2019-11-12 VITALS — DIASTOLIC BLOOD PRESSURE: 56 MMHG

## 2019-11-12 VITALS — SYSTOLIC BLOOD PRESSURE: 136 MMHG | DIASTOLIC BLOOD PRESSURE: 56 MMHG

## 2019-11-12 VITALS — DIASTOLIC BLOOD PRESSURE: 70 MMHG

## 2019-11-12 VITALS — DIASTOLIC BLOOD PRESSURE: 71 MMHG

## 2019-11-12 LAB
ALBUMIN SERPL-MCNC: 2.7 GM/DL (ref 3.1–4.5)
ALP SERPL-CCNC: 99 U/L (ref 45–117)
ALT SERPL W P-5'-P-CCNC: 18 U/L (ref 12–78)
AST SERPL-CCNC: 16 IU/L (ref 3–35)
BUN SERPL-MCNC: 39 MG/DL (ref 7–24)
CHLORIDE SERPL-SCNC: 103 MMOL/L (ref 98–107)
CREAT SERPL-MCNC: 1.59 MG/DL (ref 0.55–1.02)
ERYTHROCYTE [DISTWIDTH] IN BLOOD BY AUTOMATED COUNT: 14.8 % (ref 0–14.5)
HCT VFR BLD AUTO: 31.3 % (ref 37–47)
HGB BLD-MCNC: 10 G/DL (ref 12–16)
IRON SERPL-MCNC: 38 UG/DL (ref 50–170)
MCH RBC QN AUTO: 29.9 PG (ref 27–31)
MCHC RBC AUTO-ENTMCNC: 31.9 G/DL (ref 33–37)
MCV RBC AUTO: 93.7 FL (ref 81–99)
NRBC BLD QL AUTO: 0 10*3/UL (ref 0–0)
OVALOCYTES BLD QL SMEAR: (no result)
PLATELET # BLD AUTO: 73 10*3/UL (ref 130–400)
PLATELET SUFFICIENCY: (no result)
PMV BLD AUTO: 11.7 FL (ref 9.6–12.3)
POTASSIUM SERPL-SCNC: 4.2 MMOL/L (ref 3.5–5.1)
PROT SERPL-MCNC: 5.3 GM/DL (ref 6.4–8.2)
RBC # BLD AUTO: 3.34 10*6/UL (ref 4.1–5.1)
SCHISTOCYTES BLD QL SMEAR: (no result)
SODIUM SERPL-SCNC: 136 MMOL/L (ref 136–145)
TIBC SERPL-MCNC: 341 UG/DL (ref 250–450)
TOTAL CELLS COUNTED: 100 #CELLS
VARIANT LYMPHS NFR BLD MANUAL: 1 % (ref 0–0)
WBC NRBC COR # BLD AUTO: 5.7 10*3/UL (ref 4.8–10.8)

## 2019-11-12 PROCEDURE — 5A09357 ASSISTANCE WITH RESPIRATORY VENTILATION, LESS THAN 24 CONSECUTIVE HOURS, CONTINUOUS POSITIVE AIRWAY PRESSURE: ICD-10-PCS

## 2019-11-12 PROCEDURE — 4A02XM4 MEASUREMENT OF CARDIAC TOTAL ACTIVITY, EXTERNAL APPROACH: ICD-10-PCS | Performed by: INTERNAL MEDICINE

## 2019-11-12 PROCEDURE — 3E073KZ INTRODUCTION OF OTHER DIAGNOSTIC SUBSTANCE INTO CORONARY ARTERY, PERCUTANEOUS APPROACH: ICD-10-PCS | Performed by: EMERGENCY MEDICINE

## 2019-11-12 NOTE — NUR
RESTING IN BED WITH VISITORS AT HER SIDE. HEPARIN INFUSING WITH NO PROBLEM. NO
C/O AT THIS TIME. CALL LIGHT IN REACH.

## 2019-11-12 NOTE — NUR
IV started left wrist with #24 angiocath after 1 attempts.
The IV site was prepped with Chloraprep.
Heparin lock attached.
Sterile dressing applied. Patient tolerated precedure well.
Procedure performed according to [facility] policy & procedure.
BSG-337, SEE JUMANAR.
DAGO NICHOLSON

## 2019-11-12 NOTE — NUR
PT AWAKE IN BED. DENIES ANY CHEST PAIN CURRENTLY, BUT IS C/O COUGH. REQUESTING
A COUGH DROP AND/OR THROAT LOZENGE. WILL NOTIFY  OF PT'S REQUEST.
 
RESPIRATORY AT BEDSIDE TO ADMINISTER BREATHING TX.

## 2019-11-12 NOTE — NUR
INFORMED CONSENT OBTAINED FOR LEXISCAN NUCLEAR STRESS TEST WITH DR. RAYA.
RESTING EKG NSR WITH A RESTING HR OF 69 WITH BP /66. LUNGS CLEAR WITH
SP02 OF 98% WITH NASAL 02 AT 2L. PT COMPLETED A 1:00 LEXISCAN PROTOCOL
RECEIVING LEXISCAN 0.4 MG IV OVER 10 SECONDS. HAD NO CHEST PAIN OR ANY EKG
CHANGES. DID C/O NAUSEA THAT SUBSIDED END OF RECOVERY. HAD A PEAK HR OF 85
WITH BP /40. LAST RECOVERY HR OF 81 WITH BP /50. AWAITING SCANNING
IN STABLE CONDITION POST TESTING WITH FAMILY MEMBER.

## 2019-11-12 NOTE — NUR
PT RESTING IN BED. BSG-288, SEE EMAR. TOLERATED ROUTINE MED WITH NO PROBLEM.
IV HEPARIN INFUSING WITH NO PROBLEM. NO C/O AT THIS TIME. CALL LIGHT IN REACH.
BED ALARM ON.

## 2019-11-12 NOTE — NUR
case management visits with patient, she states she will return home with her
granddaughter and wants to resume [facility], case management will send a referral
to [facility] when patient is medically table for discharge, case management will
follow to see if patient is discharged to home or transferred

## 2019-11-12 NOTE — NUR
SVEN SEGUNDO REGARDING DR. RAYA NOTE TO START HEPARIN IV, CALLED DR. RAYA
AND OK TO START THIS AM. MADE SVEN AWARE.

## 2019-11-12 NOTE — NUR
CEPACHOL ADMINISTERED PER PRN ORDER FOR C/O COUGH.
 
PT . PT STATES SHE TAKES BASAGLAR ONLY AT HOME. 40 UNITS IN AM (1000)
AND 30 UNITS AT HS (2200). HOME MED REC UPDATED TO REFLECT CHANGES.

## 2019-11-12 NOTE — NUR
PT RESTING IN BED. RESP-EASY AND REGULAR. MOIST NONPROD COUGH NOTED. LUNGS
WHEEZES AND DIMINISHED T/O. OXYGEN IN USE. CALL LIGHT IN REACH. SEE SHIFT
ASSESSMENT.

## 2019-11-13 VITALS — DIASTOLIC BLOOD PRESSURE: 59 MMHG

## 2019-11-13 VITALS — DIASTOLIC BLOOD PRESSURE: 43 MMHG | SYSTOLIC BLOOD PRESSURE: 111 MMHG

## 2019-11-13 VITALS — DIASTOLIC BLOOD PRESSURE: 60 MMHG

## 2019-11-13 VITALS — DIASTOLIC BLOOD PRESSURE: 68 MMHG

## 2019-11-13 VITALS — DIASTOLIC BLOOD PRESSURE: 50 MMHG

## 2019-11-13 LAB
BASOPHILS # BLD AUTO: 0 10*3/UL (ref 0–0.1)
BASOPHILS NFR BLD AUTO: 0 % (ref 0–1)
EOSINOPHIL # BLD AUTO: 0 % (ref 1–4)
EOSINOPHIL # BLD AUTO: 0 10*3/UL (ref 0–0.4)
ERYTHROCYTE [DISTWIDTH] IN BLOOD BY AUTOMATED COUNT: 14.6 % (ref 0–14.5)
HCT VFR BLD AUTO: 31.1 % (ref 37–47)
HGB BLD-MCNC: 9.9 G/DL (ref 12–16)
LYMPHOCYTES # BLD AUTO: 0.4 10*3/UL (ref 1.3–4.4)
LYMPHOCYTES NFR BLD AUTO: 7.9 % (ref 27–41)
MCH RBC QN AUTO: 29.9 PG (ref 27–31)
MCHC RBC AUTO-ENTMCNC: 31.8 G/DL (ref 33–37)
MCV RBC AUTO: 94 FL (ref 81–99)
MONOCYTES # BLD AUTO: 0.2 10*3/UL (ref 0.1–1)
MONOCYTES NFR BLD MANUAL: 3.8 % (ref 3–9)
NEUT #: 4.8 10*3/UL (ref 2.3–7.9)
NEUT %: 87.6 % (ref 47–73)
NRBC BLD QL AUTO: 0 % (ref 0–0)
PLATELET # BLD AUTO: 70 10*3/UL (ref 130–400)
PMV BLD AUTO: 12.1 FL (ref 9.6–12.3)
RBC # BLD AUTO: 3.31 10*6/UL (ref 4.1–5.1)
WBC NRBC COR # BLD AUTO: 5.5 10*3/UL (ref 4.8–10.8)

## 2019-11-13 NOTE — NUR
CALLED SVEN RICHTER, REGARDING HEPARIN GTT AND APTT. DISCUSSED APTT >139.0
FOR THE PAST 3 DRAWS. HEPARIN HAS BEEN HELD SINCE 0120 AND APTT RESULTED AS
63.0. PER SVEN RICHTER, RESTART GTT AT 8 UNITS/KG/HR AND RECHECK APTT 2 HOURS
AFTER RESTARTING. WILL NOTIFY AM SHIFT RN OF SVEN'S ORDERS.

## 2019-11-13 NOTE — NUR
NOTIFIED OF APTT STILL >139.0. INSTRUCTED TO KEEP HEPARIN GTT
ON HOLD & REPEAT APTT IN 2 HOURS FROM NOW.

## 2019-11-13 NOTE — NUR
HEPARIN GTT RESTARTED AT 8 UNITS/KG/HR. VERIFIED BY TWO RNs. APTT ORDERED FOR
1000, 2 HOURS AFTER REINITATION PER SVEN RICHTER'S ORDERS. AM SHIFT RN AWARE
OF UPDATES.

## 2019-11-13 NOTE — NUR
case management visits with patient, daughter present, discussed with them [facility]
and patient and daughter declined, daughter stated her mom had [facility] in the past and the family and the patient were disappointed in their
services, educated them that there are other home health companies and they
could choose from one of them, both declined, stated they didn't want any home
health at this time

## 2019-11-13 NOTE — NUR
NOTIFIED OF APTT >139.0. AWARE THAT HEPARIN TO BE STOPPED FOR 1 HR PER
POLICY AND DECREASED BY 3 UNITS/KG/HR. PER , STOP HEPARIN GTT FOR 1 HR
& RECHECK APTT. WILL DECIDE TO RESTART DEPENDING ON RESULT.

## 2019-11-14 VITALS — SYSTOLIC BLOOD PRESSURE: 134 MMHG | DIASTOLIC BLOOD PRESSURE: 56 MMHG

## 2019-11-14 VITALS — DIASTOLIC BLOOD PRESSURE: 56 MMHG

## 2019-11-14 VITALS — DIASTOLIC BLOOD PRESSURE: 52 MMHG

## 2019-11-14 VITALS — DIASTOLIC BLOOD PRESSURE: 68 MMHG

## 2019-11-14 LAB
ALBUMIN SERPL-MCNC: 2.7 GM/DL (ref 3.1–4.5)
ALP SERPL-CCNC: 93 U/L (ref 45–117)
ALT SERPL W P-5'-P-CCNC: 20 U/L (ref 12–78)
AST SERPL-CCNC: 14 IU/L (ref 3–35)
BUN SERPL-MCNC: 37 MG/DL (ref 7–24)
CHLORIDE SERPL-SCNC: 102 MMOL/L (ref 98–107)
CREAT SERPL-MCNC: 1.22 MG/DL (ref 0.55–1.02)
POTASSIUM SERPL-SCNC: 4.1 MMOL/L (ref 3.5–5.1)
PROT SERPL-MCNC: 5.4 GM/DL (ref 6.4–8.2)
SODIUM SERPL-SCNC: 136 MMOL/L (ref 136–145)

## 2019-11-14 NOTE — NUR
case management visits with patient, she states she will be returning home
possibly today. again discussed with her VNA and she declines any home
services

## 2019-11-14 NOTE — NUR
Patient resting quietly with no c/o discomfort. Respirations easy and regular.
Vital signs stable. No overt distress. CALL LIGHT WITHIN REACH.
KY POLLOCK

## 2019-11-14 NOTE — NUR
Discharge instructions reviewed with patient/family. Patient receptive and
verbalizes understanding. Follow-up care arranged. Written instructions given
to patient/family.
LIZETH PAULA

## 2019-11-21 ENCOUNTER — HOSPITAL ENCOUNTER (OUTPATIENT)
Dept: HOSPITAL 83 - RESCLI | Age: 74
Discharge: HOME | End: 2019-11-21
Attending: INTERNAL MEDICINE
Payer: MEDICARE

## 2019-11-21 DIAGNOSIS — I50.32: ICD-10-CM

## 2019-11-21 DIAGNOSIS — K21.9: ICD-10-CM

## 2019-11-21 DIAGNOSIS — M15.8: ICD-10-CM

## 2019-11-21 DIAGNOSIS — Z88.8: ICD-10-CM

## 2019-11-21 DIAGNOSIS — N18.3: ICD-10-CM

## 2019-11-21 DIAGNOSIS — I82.502: ICD-10-CM

## 2019-11-21 DIAGNOSIS — E03.9: ICD-10-CM

## 2019-11-21 DIAGNOSIS — F41.9: ICD-10-CM

## 2019-11-21 DIAGNOSIS — E11.22: ICD-10-CM

## 2019-11-21 DIAGNOSIS — I13.0: ICD-10-CM

## 2019-11-21 DIAGNOSIS — Z79.899: ICD-10-CM

## 2019-11-21 DIAGNOSIS — Z09: Primary | ICD-10-CM

## 2019-11-21 DIAGNOSIS — Z79.4: ICD-10-CM

## 2019-11-21 DIAGNOSIS — G47.33: ICD-10-CM

## 2019-11-21 DIAGNOSIS — J44.9: ICD-10-CM

## 2019-11-21 DIAGNOSIS — G89.4: ICD-10-CM

## 2020-02-28 ENCOUNTER — HOSPITAL ENCOUNTER (OUTPATIENT)
Dept: HOSPITAL 83 - RESCLI | Age: 75
Discharge: HOME | End: 2020-02-28
Attending: INTERNAL MEDICINE
Payer: MEDICARE

## 2020-02-28 DIAGNOSIS — Z79.4: Primary | ICD-10-CM

## 2020-02-28 DIAGNOSIS — D63.1: ICD-10-CM

## 2020-02-28 DIAGNOSIS — R68.89: ICD-10-CM

## 2020-02-28 DIAGNOSIS — E66.01: ICD-10-CM

## 2020-02-28 LAB
ALBUMIN SERPL-MCNC: 3.2 GM/DL (ref 3.1–4.5)
ALP SERPL-CCNC: 138 U/L (ref 45–117)
ALT SERPL W P-5'-P-CCNC: 16 U/L (ref 12–78)
AST SERPL-CCNC: 18 IU/L (ref 3–35)
BASOPHILS # BLD AUTO: 0 10*3/UL (ref 0–0.1)
BASOPHILS NFR BLD AUTO: 0.8 % (ref 0–1)
BUN SERPL-MCNC: 21 MG/DL (ref 7–24)
CHLORIDE SERPL-SCNC: 110 MMOL/L (ref 98–107)
CREAT SERPL-MCNC: 1.21 MG/DL (ref 0.55–1.02)
EOSINOPHIL # BLD AUTO: 0.2 10*3/UL (ref 0–0.4)
EOSINOPHIL # BLD AUTO: 4.6 % (ref 1–4)
ERYTHROCYTE [DISTWIDTH] IN BLOOD BY AUTOMATED COUNT: 14.9 % (ref 0–14.5)
HCT VFR BLD AUTO: 35.3 % (ref 37–47)
HGB BLD-MCNC: 11.1 G/DL (ref 12–16)
LYMPHOCYTES # BLD AUTO: 1.2 10*3/UL (ref 1.3–4.4)
LYMPHOCYTES NFR BLD AUTO: 24.4 % (ref 27–41)
MCH RBC QN AUTO: 28.5 PG (ref 27–31)
MCHC RBC AUTO-ENTMCNC: 31.4 G/DL (ref 33–37)
MCV RBC AUTO: 90.7 FL (ref 81–99)
MONOCYTES # BLD AUTO: 0.3 10*3/UL (ref 0.1–1)
MONOCYTES NFR BLD MANUAL: 6.7 % (ref 3–9)
NEUT #: 3.1 10*3/UL (ref 2.3–7.9)
NEUT %: 63.1 % (ref 47–73)
NRBC BLD QL AUTO: 0 10*3/UL (ref 0–0)
PLATELET # BLD AUTO: 145 10*3/UL (ref 130–400)
PMV BLD AUTO: 10.8 FL (ref 9.6–12.3)
POTASSIUM SERPL-SCNC: 4.6 MMOL/L (ref 3.5–5.1)
PROT SERPL-MCNC: 6.7 GM/DL (ref 6.4–8.2)
RBC # BLD AUTO: 3.89 10*6/UL (ref 4.1–5.1)
SODIUM SERPL-SCNC: 141 MMOL/L (ref 136–145)
WBC NRBC COR # BLD AUTO: 5 10*3/UL (ref 4.8–10.8)

## 2020-03-11 ENCOUNTER — HOSPITAL ENCOUNTER (EMERGENCY)
Dept: HOSPITAL 83 - ED | Age: 75
Discharge: HOME | End: 2020-03-11
Payer: MEDICARE

## 2020-03-11 VITALS — WEIGHT: 283 LBS | BODY MASS INDEX: 50.14 KG/M2 | HEIGHT: 62.99 IN

## 2020-03-11 VITALS — DIASTOLIC BLOOD PRESSURE: 77 MMHG

## 2020-03-11 DIAGNOSIS — E11.9: ICD-10-CM

## 2020-03-11 DIAGNOSIS — Z79.4: ICD-10-CM

## 2020-03-11 DIAGNOSIS — E03.9: ICD-10-CM

## 2020-03-11 DIAGNOSIS — I50.9: ICD-10-CM

## 2020-03-11 DIAGNOSIS — K21.9: ICD-10-CM

## 2020-03-11 DIAGNOSIS — Z91.013: ICD-10-CM

## 2020-03-11 DIAGNOSIS — Z79.2: ICD-10-CM

## 2020-03-11 DIAGNOSIS — Z90.710: ICD-10-CM

## 2020-03-11 DIAGNOSIS — J44.9: ICD-10-CM

## 2020-03-11 DIAGNOSIS — Z91.041: ICD-10-CM

## 2020-03-11 DIAGNOSIS — Z79.82: ICD-10-CM

## 2020-03-11 DIAGNOSIS — M19.90: ICD-10-CM

## 2020-03-11 DIAGNOSIS — I11.0: ICD-10-CM

## 2020-03-11 DIAGNOSIS — Z79.899: ICD-10-CM

## 2020-03-11 DIAGNOSIS — K12.0: Primary | ICD-10-CM

## 2020-04-01 ENCOUNTER — HOSPITAL ENCOUNTER (INPATIENT)
Dept: HOSPITAL 83 - ED | Age: 75
LOS: 6 days | Discharge: OTHER INSTITUTION | DRG: 871 | End: 2020-04-07
Attending: INTERNAL MEDICINE | Admitting: INTERNAL MEDICINE
Payer: MEDICARE

## 2020-04-01 VITALS — SYSTOLIC BLOOD PRESSURE: 107 MMHG | DIASTOLIC BLOOD PRESSURE: 68 MMHG

## 2020-04-01 VITALS — SYSTOLIC BLOOD PRESSURE: 113 MMHG | DIASTOLIC BLOOD PRESSURE: 58 MMHG

## 2020-04-01 VITALS — DIASTOLIC BLOOD PRESSURE: 58 MMHG | SYSTOLIC BLOOD PRESSURE: 121 MMHG

## 2020-04-01 VITALS — BODY MASS INDEX: 48.96 KG/M2 | WEIGHT: 276.31 LBS | HEIGHT: 62.99 IN

## 2020-04-01 VITALS — SYSTOLIC BLOOD PRESSURE: 128 MMHG | DIASTOLIC BLOOD PRESSURE: 47 MMHG

## 2020-04-01 VITALS — SYSTOLIC BLOOD PRESSURE: 124 MMHG | DIASTOLIC BLOOD PRESSURE: 65 MMHG

## 2020-04-01 VITALS — SYSTOLIC BLOOD PRESSURE: 130 MMHG | DIASTOLIC BLOOD PRESSURE: 62 MMHG

## 2020-04-01 VITALS — DIASTOLIC BLOOD PRESSURE: 60 MMHG

## 2020-04-01 DIAGNOSIS — I13.0: ICD-10-CM

## 2020-04-01 DIAGNOSIS — J45.21: ICD-10-CM

## 2020-04-01 DIAGNOSIS — J22: ICD-10-CM

## 2020-04-01 DIAGNOSIS — Z79.82: ICD-10-CM

## 2020-04-01 DIAGNOSIS — E11.22: ICD-10-CM

## 2020-04-01 DIAGNOSIS — Z96.651: ICD-10-CM

## 2020-04-01 DIAGNOSIS — Z90.49: ICD-10-CM

## 2020-04-01 DIAGNOSIS — A40.1: Primary | ICD-10-CM

## 2020-04-01 DIAGNOSIS — Z79.899: ICD-10-CM

## 2020-04-01 DIAGNOSIS — E43: ICD-10-CM

## 2020-04-01 DIAGNOSIS — Z88.8: ICD-10-CM

## 2020-04-01 DIAGNOSIS — M19.90: ICD-10-CM

## 2020-04-01 DIAGNOSIS — E11.65: ICD-10-CM

## 2020-04-01 DIAGNOSIS — Z20.828: ICD-10-CM

## 2020-04-01 DIAGNOSIS — R65.20: ICD-10-CM

## 2020-04-01 DIAGNOSIS — E55.9: ICD-10-CM

## 2020-04-01 DIAGNOSIS — Z91.018: ICD-10-CM

## 2020-04-01 DIAGNOSIS — G47.33: ICD-10-CM

## 2020-04-01 DIAGNOSIS — E53.9: ICD-10-CM

## 2020-04-01 DIAGNOSIS — I42.0: ICD-10-CM

## 2020-04-01 DIAGNOSIS — K21.9: ICD-10-CM

## 2020-04-01 DIAGNOSIS — Z91.013: ICD-10-CM

## 2020-04-01 DIAGNOSIS — Z90.710: ICD-10-CM

## 2020-04-01 DIAGNOSIS — G89.4: ICD-10-CM

## 2020-04-01 DIAGNOSIS — J44.1: ICD-10-CM

## 2020-04-01 DIAGNOSIS — D50.9: ICD-10-CM

## 2020-04-01 DIAGNOSIS — Z82.49: ICD-10-CM

## 2020-04-01 DIAGNOSIS — E03.9: ICD-10-CM

## 2020-04-01 DIAGNOSIS — E66.01: ICD-10-CM

## 2020-04-01 DIAGNOSIS — I50.9: ICD-10-CM

## 2020-04-01 DIAGNOSIS — N18.3: ICD-10-CM

## 2020-04-01 DIAGNOSIS — Z79.4: ICD-10-CM

## 2020-04-01 DIAGNOSIS — J96.11: ICD-10-CM

## 2020-04-01 DIAGNOSIS — M94.0: ICD-10-CM

## 2020-04-01 LAB
ALBUMIN SERPL-MCNC: 2.7 GM/DL (ref 3.1–4.5)
ALP SERPL-CCNC: 98 U/L (ref 45–117)
ALT SERPL W P-5'-P-CCNC: 13 U/L (ref 12–78)
APPEARANCE UR: (no result)
APTT PPP: 40.1 SECONDS (ref 20–32.1)
AST SERPL-CCNC: 25 IU/L (ref 3–35)
BACTERIA #/AREA URNS HPF: (no result) /[HPF]
BASOPHILS # BLD AUTO: 1 % (ref 0–1)
BILIRUB UR QL STRIP: NEGATIVE
BUN SERPL-MCNC: 23 MG/DL (ref 7–24)
CHLORIDE SERPL-SCNC: 108 MMOL/L (ref 98–107)
COLOR UR: YELLOW
CREAT SERPL-MCNC: 1.2 MG/DL (ref 0.55–1.02)
EPI CELLS #/AREA URNS HPF: (no result) /[HPF]
ERYTHROCYTE [DISTWIDTH] IN BLOOD BY AUTOMATED COUNT: 15.3 % (ref 0–14.5)
GLUCOSE UR QL: NEGATIVE
HCT VFR BLD AUTO: 28.7 % (ref 37–47)
HGB BLD-MCNC: 8.8 G/DL (ref 12–16)
HGB UR QL STRIP: (no result)
INR BLD: 1.9 (ref 2–3.5)
KETONES UR QL STRIP: NEGATIVE
LEUKOCYTE ESTERASE UR QL STRIP: (no result)
MCH RBC QN AUTO: 27.7 PG (ref 27–31)
MCHC RBC AUTO-ENTMCNC: 30.7 G/DL (ref 33–37)
MCV RBC AUTO: 90.3 FL (ref 81–99)
NITRITE UR QL STRIP: POSITIVE
NRBC BLD QL AUTO: 0 10*3/UL (ref 0–0)
OVALOCYTES BLD QL SMEAR: (no result)
PH UR STRIP: 5 [PH] (ref 5–9)
PLATELET # BLD AUTO: 98 10*3/UL (ref 130–400)
PLATELET SUFFICIENCY: (no result)
PMV BLD AUTO: 10.8 FL (ref 9.6–12.3)
POTASSIUM SERPL-SCNC: 4 MMOL/L (ref 3.5–5.1)
PROT SERPL-MCNC: 5.8 GM/DL (ref 6.4–8.2)
RBC # BLD AUTO: 3.18 10*6/UL (ref 4.1–5.1)
RBC #/AREA URNS HPF: (no result) RBC/HPF (ref 0–2)
SODIUM SERPL-SCNC: 139 MMOL/L (ref 136–145)
SP GR UR: 1 (ref 1–1.03)
TOTAL CELLS COUNTED: 100 #CELLS
TROPONIN I SERPL-MCNC: 0.21 NG/ML (ref ?–0.04)
UROBILINOGEN UR STRIP-MCNC: 0.2 E.U./DL (ref 0.2–1)
WBC #/AREA URNS HPF: (no result) WBC/HPF (ref 0–5)
WBC NRBC COR # BLD AUTO: 9.3 10*3/UL (ref 4.8–10.8)

## 2020-04-02 VITALS — SYSTOLIC BLOOD PRESSURE: 128 MMHG | DIASTOLIC BLOOD PRESSURE: 51 MMHG

## 2020-04-02 VITALS — DIASTOLIC BLOOD PRESSURE: 56 MMHG

## 2020-04-02 VITALS — DIASTOLIC BLOOD PRESSURE: 52 MMHG | SYSTOLIC BLOOD PRESSURE: 130 MMHG

## 2020-04-02 VITALS — DIASTOLIC BLOOD PRESSURE: 63 MMHG | SYSTOLIC BLOOD PRESSURE: 132 MMHG

## 2020-04-02 VITALS — DIASTOLIC BLOOD PRESSURE: 47 MMHG

## 2020-04-02 LAB
ALBUMIN SERPL-MCNC: 2.5 GM/DL (ref 3.1–4.5)
ALP SERPL-CCNC: 77 U/L (ref 45–117)
ALT SERPL W P-5'-P-CCNC: 14 U/L (ref 12–78)
APPEARANCE UR: (no result)
AST SERPL-CCNC: 22 IU/L (ref 3–35)
BACTERIA #/AREA URNS HPF: (no result) /[HPF]
BASOPHILS # BLD AUTO: 0 10*3/UL (ref 0–0.1)
BASOPHILS NFR BLD AUTO: 0.4 % (ref 0–1)
BILIRUB UR QL STRIP: NEGATIVE
BUN SERPL-MCNC: 27 MG/DL (ref 7–24)
CHLORIDE SERPL-SCNC: 108 MMOL/L (ref 98–107)
COLOR UR: YELLOW
CREAT SERPL-MCNC: 1.19 MG/DL (ref 0.55–1.02)
EOSINOPHIL # BLD AUTO: 0.1 10*3/UL (ref 0–0.4)
EOSINOPHIL # BLD AUTO: 1.5 % (ref 1–4)
EPI CELLS #/AREA URNS HPF: (no result) /[HPF]
ERYTHROCYTE [DISTWIDTH] IN BLOOD BY AUTOMATED COUNT: 15.4 % (ref 0–14.5)
GLUCOSE UR QL: NEGATIVE
HCT VFR BLD AUTO: 26.5 % (ref 37–47)
HGB BLD-MCNC: 8.1 G/DL (ref 12–16)
HGB UR QL STRIP: (no result)
KETONES UR QL STRIP: NEGATIVE
LEUKOCYTE ESTERASE UR QL STRIP: (no result)
LYMPHOCYTES # BLD AUTO: 0.9 10*3/UL (ref 1.3–4.4)
LYMPHOCYTES NFR BLD AUTO: 16.4 % (ref 27–41)
MCH RBC QN AUTO: 27.6 PG (ref 27–31)
MCHC RBC AUTO-ENTMCNC: 30.6 G/DL (ref 33–37)
MCV RBC AUTO: 90.4 FL (ref 81–99)
MONOCYTES # BLD AUTO: 0.4 10*3/UL (ref 0.1–1)
MONOCYTES NFR BLD MANUAL: 7.7 % (ref 3–9)
NEUT #: 3.9 10*3/UL (ref 2.3–7.9)
NEUT %: 73.8 % (ref 47–73)
NITRITE UR QL STRIP: NEGATIVE
NRBC BLD QL AUTO: 0 10*3/UL (ref 0–0)
PH UR STRIP: 5 [PH] (ref 5–9)
PHOSPHATE SERPL-MCNC: 3.1 MG/DL (ref 2.5–4.9)
PLATELET # BLD AUTO: 88 10*3/UL (ref 130–400)
PMV BLD AUTO: 11.7 FL (ref 9.6–12.3)
POTASSIUM SERPL-SCNC: 3.6 MMOL/L (ref 3.5–5.1)
PROT SERPL-MCNC: 5.5 GM/DL (ref 6.4–8.2)
RBC # BLD AUTO: 2.93 10*6/UL (ref 4.1–5.1)
RBC #/AREA URNS HPF: (no result) RBC/HPF (ref 0–2)
SODIUM SERPL-SCNC: 140 MMOL/L (ref 136–145)
SP GR UR: 1.01 (ref 1–1.03)
UROBILINOGEN UR STRIP-MCNC: 0.2 E.U./DL (ref 0.2–1)
WBC #/AREA URNS HPF: (no result) WBC/HPF (ref 0–5)
WBC NRBC COR # BLD AUTO: 5.3 10*3/UL (ref 4.8–10.8)

## 2020-04-03 VITALS — DIASTOLIC BLOOD PRESSURE: 44 MMHG | SYSTOLIC BLOOD PRESSURE: 117 MMHG

## 2020-04-03 VITALS — DIASTOLIC BLOOD PRESSURE: 63 MMHG | SYSTOLIC BLOOD PRESSURE: 142 MMHG

## 2020-04-03 VITALS — DIASTOLIC BLOOD PRESSURE: 53 MMHG

## 2020-04-03 VITALS — DIASTOLIC BLOOD PRESSURE: 55 MMHG

## 2020-04-03 LAB
BASOPHILS # BLD AUTO: 0 10*3/UL (ref 0–0.1)
BASOPHILS NFR BLD AUTO: 0.3 % (ref 0–1)
BUN SERPL-MCNC: 28 MG/DL (ref 7–24)
CHLORIDE SERPL-SCNC: 107 MMOL/L (ref 98–107)
CREAT SERPL-MCNC: 1.04 MG/DL (ref 0.55–1.02)
EOSINOPHIL # BLD AUTO: 0.2 10*3/UL (ref 0–0.4)
EOSINOPHIL # BLD AUTO: 4.4 % (ref 1–4)
ERYTHROCYTE [DISTWIDTH] IN BLOOD BY AUTOMATED COUNT: 15 % (ref 0–14.5)
HCT VFR BLD AUTO: 25.2 % (ref 37–47)
HGB BLD-MCNC: 7.8 G/DL (ref 12–16)
LYMPHOCYTES # BLD AUTO: 1 10*3/UL (ref 1.3–4.4)
LYMPHOCYTES NFR BLD AUTO: 25.3 % (ref 27–41)
MCH RBC QN AUTO: 28 PG (ref 27–31)
MCHC RBC AUTO-ENTMCNC: 31 G/DL (ref 33–37)
MCV RBC AUTO: 90.3 FL (ref 81–99)
MONOCYTES # BLD AUTO: 0.4 10*3/UL (ref 0.1–1)
MONOCYTES NFR BLD MANUAL: 9.4 % (ref 3–9)
NEUT #: 2.3 10*3/UL (ref 2.3–7.9)
NEUT %: 60.3 % (ref 47–73)
NRBC BLD QL AUTO: 0 10*3/UL (ref 0–0)
PLATELET # BLD AUTO: 89 10*3/UL (ref 130–400)
PMV BLD AUTO: 11.1 FL (ref 9.6–12.3)
POTASSIUM SERPL-SCNC: 3.6 MMOL/L (ref 3.5–5.1)
RBC # BLD AUTO: 2.79 10*6/UL (ref 4.1–5.1)
SODIUM SERPL-SCNC: 141 MMOL/L (ref 136–145)
WBC NRBC COR # BLD AUTO: 3.8 10*3/UL (ref 4.8–10.8)

## 2020-04-04 VITALS — DIASTOLIC BLOOD PRESSURE: 55 MMHG | SYSTOLIC BLOOD PRESSURE: 126 MMHG

## 2020-04-04 VITALS — DIASTOLIC BLOOD PRESSURE: 46 MMHG

## 2020-04-04 VITALS — DIASTOLIC BLOOD PRESSURE: 56 MMHG

## 2020-04-04 VITALS — DIASTOLIC BLOOD PRESSURE: 57 MMHG

## 2020-04-04 VITALS — DIASTOLIC BLOOD PRESSURE: 62 MMHG

## 2020-04-04 LAB
ALBUMIN SERPL-MCNC: 2.3 GM/DL (ref 3.1–4.5)
ALP SERPL-CCNC: 90 U/L (ref 45–117)
ALT SERPL W P-5'-P-CCNC: 14 U/L (ref 12–78)
AST SERPL-CCNC: 20 IU/L (ref 3–35)
BASOPHILS # BLD AUTO: 0 10*3/UL (ref 0–0.1)
BASOPHILS NFR BLD AUTO: 0.5 % (ref 0–1)
BUN SERPL-MCNC: 25 MG/DL (ref 7–24)
CHLORIDE SERPL-SCNC: 101 MMOL/L (ref 98–107)
CREAT SERPL-MCNC: 0.97 MG/DL (ref 0.55–1.02)
EOSINOPHIL # BLD AUTO: 0.2 10*3/UL (ref 0–0.4)
EOSINOPHIL # BLD AUTO: 5.8 % (ref 1–4)
ERYTHROCYTE [DISTWIDTH] IN BLOOD BY AUTOMATED COUNT: 14.6 % (ref 0–14.5)
HCT VFR BLD AUTO: 25.8 % (ref 37–47)
HGB BLD-MCNC: 8 G/DL (ref 12–16)
LYMPHOCYTES # BLD AUTO: 1.1 10*3/UL (ref 1.3–4.4)
LYMPHOCYTES NFR BLD AUTO: 28.9 % (ref 27–41)
MCH RBC QN AUTO: 28 PG (ref 27–31)
MCHC RBC AUTO-ENTMCNC: 31 G/DL (ref 33–37)
MCV RBC AUTO: 90.2 FL (ref 81–99)
MONOCYTES # BLD AUTO: 0.4 10*3/UL (ref 0.1–1)
MONOCYTES NFR BLD MANUAL: 10.6 % (ref 3–9)
NEUT #: 2 10*3/UL (ref 2.3–7.9)
NEUT %: 53.7 % (ref 47–73)
NRBC BLD QL AUTO: 0 10*3/UL (ref 0–0)
PLATELET # BLD AUTO: 114 10*3/UL (ref 130–400)
PMV BLD AUTO: 10.8 FL (ref 9.6–12.3)
POTASSIUM SERPL-SCNC: 3.4 MMOL/L (ref 3.5–5.1)
PROT SERPL-MCNC: 5.5 GM/DL (ref 6.4–8.2)
RBC # BLD AUTO: 2.86 10*6/UL (ref 4.1–5.1)
SODIUM SERPL-SCNC: 141 MMOL/L (ref 136–145)
WBC NRBC COR # BLD AUTO: 3.8 10*3/UL (ref 4.8–10.8)

## 2020-04-05 VITALS — DIASTOLIC BLOOD PRESSURE: 47 MMHG

## 2020-04-05 VITALS — SYSTOLIC BLOOD PRESSURE: 113 MMHG | DIASTOLIC BLOOD PRESSURE: 53 MMHG

## 2020-04-05 VITALS — SYSTOLIC BLOOD PRESSURE: 112 MMHG | DIASTOLIC BLOOD PRESSURE: 52 MMHG

## 2020-04-05 VITALS — SYSTOLIC BLOOD PRESSURE: 143 MMHG | DIASTOLIC BLOOD PRESSURE: 56 MMHG

## 2020-04-05 VITALS — DIASTOLIC BLOOD PRESSURE: 58 MMHG

## 2020-04-05 LAB
ALBUMIN SERPL-MCNC: 2.4 GM/DL (ref 3.1–4.5)
ALP SERPL-CCNC: 78 U/L (ref 45–117)
ALT SERPL W P-5'-P-CCNC: 12 U/L (ref 12–78)
AST SERPL-CCNC: 20 IU/L (ref 3–35)
BUN SERPL-MCNC: 25 MG/DL (ref 7–24)
CHLORIDE SERPL-SCNC: 99 MMOL/L (ref 98–107)
CREAT SERPL-MCNC: 0.94 MG/DL (ref 0.55–1.02)
POTASSIUM SERPL-SCNC: 3.6 MMOL/L (ref 3.5–5.1)
PROT SERPL-MCNC: 5.6 GM/DL (ref 6.4–8.2)
SODIUM SERPL-SCNC: 140 MMOL/L (ref 136–145)

## 2020-04-06 VITALS — SYSTOLIC BLOOD PRESSURE: 118 MMHG | DIASTOLIC BLOOD PRESSURE: 73 MMHG

## 2020-04-06 VITALS — DIASTOLIC BLOOD PRESSURE: 65 MMHG

## 2020-04-06 VITALS — SYSTOLIC BLOOD PRESSURE: 107 MMHG | DIASTOLIC BLOOD PRESSURE: 53 MMHG

## 2020-04-06 VITALS — SYSTOLIC BLOOD PRESSURE: 119 MMHG | DIASTOLIC BLOOD PRESSURE: 32 MMHG

## 2020-04-06 VITALS — SYSTOLIC BLOOD PRESSURE: 105 MMHG | DIASTOLIC BLOOD PRESSURE: 44 MMHG

## 2020-04-06 VITALS — DIASTOLIC BLOOD PRESSURE: 55 MMHG

## 2020-04-06 VITALS — DIASTOLIC BLOOD PRESSURE: 50 MMHG | SYSTOLIC BLOOD PRESSURE: 113 MMHG

## 2020-04-06 VITALS — DIASTOLIC BLOOD PRESSURE: 48 MMHG

## 2020-04-06 LAB
ALBUMIN SERPL-MCNC: 2.5 GM/DL (ref 3.1–4.5)
ALP SERPL-CCNC: 80 U/L (ref 45–117)
ALT SERPL W P-5'-P-CCNC: 13 U/L (ref 12–78)
AST SERPL-CCNC: 21 IU/L (ref 3–35)
BASOPHILS # BLD AUTO: 0 10*3/UL (ref 0–0.1)
BASOPHILS NFR BLD AUTO: 0.9 % (ref 0–1)
BUN SERPL-MCNC: 28 MG/DL (ref 7–24)
CHLORIDE SERPL-SCNC: 98 MMOL/L (ref 98–107)
CREAT SERPL-MCNC: 1.13 MG/DL (ref 0.55–1.02)
EOSINOPHIL # BLD AUTO: 0.2 10*3/UL (ref 0–0.4)
EOSINOPHIL # BLD AUTO: 4.5 % (ref 1–4)
ERYTHROCYTE [DISTWIDTH] IN BLOOD BY AUTOMATED COUNT: 14.7 % (ref 0–14.5)
HCT VFR BLD AUTO: 27 % (ref 37–47)
HGB BLD-MCNC: 8.5 G/DL (ref 12–16)
LYMPHOCYTES # BLD AUTO: 1.3 10*3/UL (ref 1.3–4.4)
LYMPHOCYTES NFR BLD AUTO: 28.4 % (ref 27–41)
MCH RBC QN AUTO: 28.1 PG (ref 27–31)
MCHC RBC AUTO-ENTMCNC: 31.5 G/DL (ref 33–37)
MCV RBC AUTO: 89.4 FL (ref 81–99)
MONOCYTES # BLD AUTO: 0.5 10*3/UL (ref 0.1–1)
MONOCYTES NFR BLD MANUAL: 10.8 % (ref 3–9)
NEUT #: 2.6 10*3/UL (ref 2.3–7.9)
NEUT %: 55.2 % (ref 47–73)
NRBC BLD QL AUTO: 0 % (ref 0–0)
PLATELET # BLD AUTO: 131 10*3/UL (ref 130–400)
PMV BLD AUTO: 11 FL (ref 9.6–12.3)
POTASSIUM SERPL-SCNC: 3.5 MMOL/L (ref 3.5–5.1)
PROT SERPL-MCNC: 5.8 GM/DL (ref 6.4–8.2)
RBC # BLD AUTO: 3.02 10*6/UL (ref 4.1–5.1)
SODIUM SERPL-SCNC: 141 MMOL/L (ref 136–145)
WBC NRBC COR # BLD AUTO: 4.6 10*3/UL (ref 4.8–10.8)

## 2020-04-06 PROCEDURE — B24BZZ4 ULTRASONOGRAPHY OF HEART WITH AORTA, TRANSESOPHAGEAL: ICD-10-PCS | Performed by: INTERNAL MEDICINE

## 2020-04-07 VITALS — DIASTOLIC BLOOD PRESSURE: 57 MMHG

## 2020-04-07 VITALS — SYSTOLIC BLOOD PRESSURE: 114 MMHG | DIASTOLIC BLOOD PRESSURE: 49 MMHG

## 2020-04-07 LAB
BASOPHILS # BLD AUTO: 0.1 10*3/UL (ref 0–0.1)
BASOPHILS NFR BLD AUTO: 1 % (ref 0–1)
BUN SERPL-MCNC: 28 MG/DL (ref 7–24)
CHLORIDE SERPL-SCNC: 98 MMOL/L (ref 98–107)
CREAT SERPL-MCNC: 1.01 MG/DL (ref 0.55–1.02)
EOSINOPHIL # BLD AUTO: 0.3 10*3/UL (ref 0–0.4)
EOSINOPHIL # BLD AUTO: 6.7 % (ref 1–4)
ERYTHROCYTE [DISTWIDTH] IN BLOOD BY AUTOMATED COUNT: 14.6 % (ref 0–14.5)
HCT VFR BLD AUTO: 27.8 % (ref 37–47)
HGB BLD-MCNC: 8.5 G/DL (ref 12–16)
LYMPHOCYTES # BLD AUTO: 1.4 10*3/UL (ref 1.3–4.4)
LYMPHOCYTES NFR BLD AUTO: 27.2 % (ref 27–41)
MCH RBC QN AUTO: 27.7 PG (ref 27–31)
MCHC RBC AUTO-ENTMCNC: 30.6 G/DL (ref 33–37)
MCV RBC AUTO: 90.6 FL (ref 81–99)
MONOCYTES # BLD AUTO: 0.5 10*3/UL (ref 0.1–1)
MONOCYTES NFR BLD MANUAL: 9.9 % (ref 3–9)
NEUT #: 2.7 10*3/UL (ref 2.3–7.9)
NEUT %: 54.6 % (ref 47–73)
NRBC BLD QL AUTO: 0 % (ref 0–0)
PLATELET # BLD AUTO: 155 10*3/UL (ref 130–400)
PMV BLD AUTO: 11.2 FL (ref 9.6–12.3)
POTASSIUM SERPL-SCNC: 3.4 MMOL/L (ref 3.5–5.1)
RBC # BLD AUTO: 3.07 10*6/UL (ref 4.1–5.1)
SODIUM SERPL-SCNC: 139 MMOL/L (ref 136–145)
WBC NRBC COR # BLD AUTO: 5 10*3/UL (ref 4.8–10.8)

## 2020-05-24 ENCOUNTER — HOSPITAL ENCOUNTER (EMERGENCY)
Dept: HOSPITAL 83 - ED | Age: 75
LOS: 1 days | Discharge: OTHER INSTITUTION | End: 2020-05-25
Payer: MEDICARE

## 2020-05-24 VITALS — HEIGHT: 64 IN | WEIGHT: 293 LBS | BODY MASS INDEX: 50.02 KG/M2

## 2020-05-24 DIAGNOSIS — K21.9: ICD-10-CM

## 2020-05-24 DIAGNOSIS — Z79.4: ICD-10-CM

## 2020-05-24 DIAGNOSIS — E03.9: ICD-10-CM

## 2020-05-24 DIAGNOSIS — M19.011: Primary | ICD-10-CM

## 2020-05-24 DIAGNOSIS — Z79.899: ICD-10-CM

## 2020-05-24 DIAGNOSIS — I13.0: ICD-10-CM

## 2020-05-24 DIAGNOSIS — Z91.018: ICD-10-CM

## 2020-05-24 DIAGNOSIS — N18.3: ICD-10-CM

## 2020-05-24 DIAGNOSIS — E11.22: ICD-10-CM

## 2020-05-24 DIAGNOSIS — Z91.013: ICD-10-CM

## 2020-05-24 DIAGNOSIS — Z91.041: ICD-10-CM

## 2020-05-24 DIAGNOSIS — Z79.82: ICD-10-CM

## 2020-05-24 DIAGNOSIS — J44.9: ICD-10-CM

## 2020-05-24 DIAGNOSIS — M19.90: ICD-10-CM

## 2020-05-25 VITALS — SYSTOLIC BLOOD PRESSURE: 107 MMHG | DIASTOLIC BLOOD PRESSURE: 53 MMHG

## 2020-06-10 ENCOUNTER — HOSPITAL ENCOUNTER (EMERGENCY)
Dept: HOSPITAL 83 - ED | Age: 75
Discharge: HOME | End: 2020-06-10
Payer: MEDICARE

## 2020-06-10 VITALS — WEIGHT: 293 LBS | BODY MASS INDEX: 50.02 KG/M2 | HEIGHT: 63.98 IN

## 2020-06-10 VITALS — DIASTOLIC BLOOD PRESSURE: 68 MMHG

## 2020-06-10 DIAGNOSIS — E66.9: ICD-10-CM
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
## 2020-06-10 DIAGNOSIS — Z91.041: ICD-10-CM

## 2020-06-10 DIAGNOSIS — E03.9: ICD-10-CM

## 2020-06-10 DIAGNOSIS — E11.42: ICD-10-CM

## 2020-06-10 DIAGNOSIS — Z79.899: ICD-10-CM

## 2020-06-10 DIAGNOSIS — J44.9: ICD-10-CM

## 2020-06-10 DIAGNOSIS — Z79.82: ICD-10-CM

## 2020-06-10 DIAGNOSIS — M19.011: Primary | ICD-10-CM

## 2020-06-10 DIAGNOSIS — I50.9: ICD-10-CM

## 2020-06-10 DIAGNOSIS — Z91.013: ICD-10-CM

## 2020-06-10 DIAGNOSIS — I11.0: ICD-10-CM

## 2020-06-10 DIAGNOSIS — K21.9: ICD-10-CM
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
## 2020-06-14 ENCOUNTER — HOSPITAL ENCOUNTER (INPATIENT)
Dept: HOSPITAL 83 - ED | Age: 75
LOS: 12 days | Discharge: OTHER INSTITUTION | DRG: 871 | End: 2020-06-26
Attending: INTERNAL MEDICINE | Admitting: INTERNAL MEDICINE
Payer: MEDICARE

## 2020-06-14 VITALS — SYSTOLIC BLOOD PRESSURE: 126 MMHG | DIASTOLIC BLOOD PRESSURE: 45 MMHG

## 2020-06-14 VITALS — SYSTOLIC BLOOD PRESSURE: 119 MMHG | DIASTOLIC BLOOD PRESSURE: 49 MMHG

## 2020-06-14 VITALS — DIASTOLIC BLOOD PRESSURE: 59 MMHG

## 2020-06-14 VITALS — HEIGHT: 62.99 IN | BODY MASS INDEX: 51.91 KG/M2 | WEIGHT: 293 LBS

## 2020-06-14 VITALS — SYSTOLIC BLOOD PRESSURE: 107 MMHG | DIASTOLIC BLOOD PRESSURE: 51 MMHG

## 2020-06-14 VITALS — DIASTOLIC BLOOD PRESSURE: 66 MMHG

## 2020-06-14 DIAGNOSIS — J44.9: ICD-10-CM

## 2020-06-14 DIAGNOSIS — E66.01: ICD-10-CM

## 2020-06-14 DIAGNOSIS — Z79.01: ICD-10-CM

## 2020-06-14 DIAGNOSIS — Z20.828: ICD-10-CM

## 2020-06-14 DIAGNOSIS — Z91.041: ICD-10-CM

## 2020-06-14 DIAGNOSIS — A41.01: Primary | ICD-10-CM

## 2020-06-14 DIAGNOSIS — E87.2: ICD-10-CM

## 2020-06-14 DIAGNOSIS — Z91.018: ICD-10-CM

## 2020-06-14 DIAGNOSIS — N18.3: ICD-10-CM

## 2020-06-14 DIAGNOSIS — K42.9: ICD-10-CM

## 2020-06-14 DIAGNOSIS — E03.9: ICD-10-CM

## 2020-06-14 DIAGNOSIS — K21.9: ICD-10-CM

## 2020-06-14 DIAGNOSIS — G47.33: ICD-10-CM

## 2020-06-14 DIAGNOSIS — Z90.49: ICD-10-CM

## 2020-06-14 DIAGNOSIS — I13.0: ICD-10-CM

## 2020-06-14 DIAGNOSIS — Z79.4: ICD-10-CM

## 2020-06-14 DIAGNOSIS — Z91.013: ICD-10-CM

## 2020-06-14 DIAGNOSIS — Z90.710: ICD-10-CM

## 2020-06-14 DIAGNOSIS — J96.10: ICD-10-CM

## 2020-06-14 DIAGNOSIS — Z79.82: ICD-10-CM

## 2020-06-14 DIAGNOSIS — E55.9: ICD-10-CM

## 2020-06-14 DIAGNOSIS — E87.8: ICD-10-CM

## 2020-06-14 DIAGNOSIS — M19.90: ICD-10-CM

## 2020-06-14 DIAGNOSIS — R65.20: ICD-10-CM

## 2020-06-14 DIAGNOSIS — E43: ICD-10-CM

## 2020-06-14 DIAGNOSIS — Z82.49: ICD-10-CM

## 2020-06-14 DIAGNOSIS — N17.0: ICD-10-CM

## 2020-06-14 DIAGNOSIS — D50.9: ICD-10-CM

## 2020-06-14 DIAGNOSIS — E11.65: ICD-10-CM

## 2020-06-14 DIAGNOSIS — G89.4: ICD-10-CM

## 2020-06-14 DIAGNOSIS — I82.402: ICD-10-CM

## 2020-06-14 DIAGNOSIS — G93.41: ICD-10-CM

## 2020-06-14 DIAGNOSIS — E11.22: ICD-10-CM

## 2020-06-14 DIAGNOSIS — I50.32: ICD-10-CM

## 2020-06-14 DIAGNOSIS — Z79.899: ICD-10-CM

## 2020-06-14 DIAGNOSIS — Z96.651: ICD-10-CM

## 2020-06-14 DIAGNOSIS — B96.89: ICD-10-CM

## 2020-06-14 DIAGNOSIS — N39.0: ICD-10-CM

## 2020-06-14 DIAGNOSIS — I38: ICD-10-CM

## 2020-06-14 DIAGNOSIS — E87.1: ICD-10-CM

## 2020-06-14 LAB
ALBUMIN SERPL-MCNC: 2.1 GM/DL (ref 3.1–4.5)
ALP SERPL-CCNC: 61 U/L (ref 45–117)
ALT SERPL W P-5'-P-CCNC: 18 U/L (ref 12–78)
APPEARANCE UR: (no result)
AST SERPL-CCNC: 31 IU/L (ref 3–35)
BACTERIA #/AREA URNS HPF: (no result) /[HPF]
BILIRUB UR QL STRIP: NEGATIVE
BUN SERPL-MCNC: 41 MG/DL (ref 7–24)
CHLORIDE SERPL-SCNC: 97 MMOL/L (ref 98–107)
COLOR UR: YELLOW
CREAT SERPL-MCNC: 1.63 MG/DL (ref 0.55–1.02)
DOHLE BOD BLD QL SMEAR: (no result)
EPI CELLS #/AREA URNS HPF: (no result) /[HPF]
ERYTHROCYTE [DISTWIDTH] IN BLOOD BY AUTOMATED COUNT: 15.9 % (ref 0–14.5)
GLUCOSE UR QL: NEGATIVE
HCT VFR BLD AUTO: 24.9 % (ref 37–47)
HGB UR QL STRIP: (no result)
KETONES UR QL STRIP: NEGATIVE
LEUKOCYTE ESTERASE UR QL STRIP: (no result)
MCH RBC QN AUTO: 24.2 PG (ref 27–31)
MCHC RBC AUTO-ENTMCNC: 30.1 G/DL (ref 33–37)
MCV RBC AUTO: 80.3 FL (ref 81–99)
MICROCYTES BLD QL SMEAR: SLIGHT
NITRITE UR QL STRIP: POSITIVE
NRBC BLD QL AUTO: 0 % (ref 0–0)
OVALOCYTES BLD QL SMEAR: (no result)
PH UR STRIP: 6 [PH] (ref 5–9)
PLATELET # BLD AUTO: 85 10*3/UL (ref 130–400)
PLATELET SUFFICIENCY: (no result)
PMV BLD AUTO: 12.6 FL (ref 9.6–12.3)
POLYCHROMASIA BLD QL SMEAR: SLIGHT
POTASSIUM SERPL-SCNC: 4 MMOL/L (ref 3.5–5.1)
PROT SERPL-MCNC: 5.8 GM/DL (ref 6.4–8.2)
RBC # BLD AUTO: 3.1 10*6/UL (ref 4.1–5.1)
RBC #/AREA URNS HPF: (no result) RBC/HPF (ref 0–2)
ROULEAUX BLD QL SMEAR: SLIGHT
SODIUM SERPL-SCNC: 130 MMOL/L (ref 136–145)
SP GR UR: 1.01 (ref 1–1.03)
TOTAL CELLS COUNTED: 100 #CELLS
TOXIC GRANULES BLD QL SMEAR: SLIGHT
UROBILINOGEN UR STRIP-MCNC: 0.2 E.U./DL (ref 0.2–1)
VACUOLATION OF NEUTROPHILS: SLIGHT
WBC #/AREA URNS HPF: (no result) WBC/HPF (ref 0–5)
WBC NRBC COR # BLD AUTO: 5.5 10*3/UL (ref 4.8–10.8)

## 2020-06-14 NOTE — NUR
PATIENT GRANDDAUGHTER CALLED FOR UPDATE ON PATIENT, ALL QUESTIONS ANSWERED TO
HER SATISFACTION, ADVISED TO TO CALL BACK IF SHE HAS ANY OTHER QUESTIONS.

## 2020-06-14 NOTE — NUR
PATIENT GIVEN PRN NORCO AT THIS TIME FOR COMPLAINT OF UNBAREABLE PAIN IN HER
RIGHT ARM AND SHOULDER. PATIENT ALERT BUT CONFUSED, CALL LIGHT WITHIN REACH
WILL CONTINUE TO MONITOR.

## 2020-06-14 NOTE — NUR
PATIENT MEDICATED WITH IV MORPHINE AT THIS TIME PER ORDER FOR COMPLAINTS OF
RIGHT ARM PAIN 9/10. WILL MONITOR FOR EFFECTIVENESS.

## 2020-06-14 NOTE — NUR
FERREIRA CATH INSERTED.  TYLENOL DOSE ORDERED.  URINE SPECIMEN SENT.  ALL OTHER
TESTS HAVE RETURNED.

## 2020-06-14 NOTE — NUR
ZBIGNIEW RAYA FROM DR. NANCE RETURNED CALL FROM ANSWERING SERVICE, ADVISED
THAT DR. QUIROGA HAD ORDERED A REPEAT SET OF BLOOD CULTURES, AND STARTED
PATIENT ON VANCOMYCIN. STATED SHE AGREED WITH THOSE ORDERS AND WOULD SEE
PATIENT IN AM.

## 2020-06-14 NOTE — NUR
Time: 1335
A 74  year old FEMALE admitted to 5E
under services of SHOBHA YUAN DO.
Pt. arrived via bed from
[facility].  Chief complaint: FEVER @ NURSING HOME.
 
JENNA MCKEON

## 2020-06-14 NOTE — NUR
PT HS NOT VOIDED AS YET ON BEDPAN AND REQUESTS TO REMAIN ON PAN FOR NOW AS SHE
IS INCONTINENT AND CANNOT TELL WHEN SHE NEEDS TO URINATE.

## 2020-06-14 NOTE — NUR
UNABLE TO SUCCESSFULLY INSERT A FERREIRA CATH FOR URINE SPECIMEN.  PT IS
INONTINENT BUT STATES SHE WILL TRY TO PROVIDE URINE SPECIMEN USING BEDPAN.

## 2020-06-14 NOTE — NUR
PATIENT RESTING IN BED IN A POSITION OF COMFORT AT THIS TIME, STATED THAT HER
PAIN WAS "NOT AS BAD BUT IT STILL HURTS". CALL LIGHT WITHIN REACH WILL
CONTINUE TO MONITOR.

## 2020-06-15 VITALS — SYSTOLIC BLOOD PRESSURE: 123 MMHG | DIASTOLIC BLOOD PRESSURE: 44 MMHG

## 2020-06-15 VITALS — DIASTOLIC BLOOD PRESSURE: 81 MMHG

## 2020-06-15 VITALS — SYSTOLIC BLOOD PRESSURE: 108 MMHG | DIASTOLIC BLOOD PRESSURE: 40 MMHG

## 2020-06-15 VITALS — DIASTOLIC BLOOD PRESSURE: 63 MMHG

## 2020-06-15 VITALS — DIASTOLIC BLOOD PRESSURE: 47 MMHG | SYSTOLIC BLOOD PRESSURE: 112 MMHG

## 2020-06-15 LAB
25(OH)D3 SERPL-MCNC: 29.4 NG/ML (ref 30–100)
ALBUMIN SERPL-MCNC: 2 GM/DL (ref 3.1–4.5)
ALP SERPL-CCNC: 70 U/L (ref 45–117)
ALT SERPL W P-5'-P-CCNC: 23 U/L (ref 12–78)
APTT PPP: 44.8 SECONDS (ref 20–32.1)
AST SERPL-CCNC: 66 IU/L (ref 3–35)
BUN SERPL-MCNC: 46 MG/DL (ref 7–24)
CHLORIDE SERPL-SCNC: 97 MMOL/L (ref 98–107)
CHOLEST SERPL-MCNC: 68 MG/DL (ref ?–200)
CREAT SERPL-MCNC: 1.89 MG/DL (ref 0.55–1.02)
ERYTHROCYTE [DISTWIDTH] IN BLOOD BY AUTOMATED COUNT: 16.2 % (ref 0–14.5)
HCT VFR BLD AUTO: 26.8 % (ref 37–47)
HDLC SERPL-MCNC: 11 MG/DL (ref 40–60)
INR BLD: 1.6 (ref 2–3.5)
LDLC SERPL DIRECT ASSAY-MCNC: 27 MG/DL (ref 9–159)
MCH RBC QN AUTO: 24.2 PG (ref 27–31)
MCHC RBC AUTO-ENTMCNC: 29.9 G/DL (ref 33–37)
MCV RBC AUTO: 81 FL (ref 81–99)
NRBC BLD QL AUTO: 0 10*3/UL (ref 0–0)
OVALOCYTES BLD QL SMEAR: (no result)
PLATELET # BLD AUTO: 97 10*3/UL (ref 130–400)
PLATELET SUFFICIENCY: (no result)
PMV BLD AUTO: 12.6 FL (ref 9.6–12.3)
POTASSIUM SERPL-SCNC: 4.4 MMOL/L (ref 3.5–5.1)
PROT SERPL-MCNC: 6 GM/DL (ref 6.4–8.2)
RBC # BLD AUTO: 3.31 10*6/UL (ref 4.1–5.1)
SODIUM SERPL-SCNC: 130 MMOL/L (ref 136–145)
T4 FREE SERPL-MCNC: 1.35 NG/DL (ref 0.76–1.46)
TOTAL CELLS COUNTED: 100 #CELLS
TRIGL SERPL-MCNC: 150 MG/DL (ref ?–150)
TSH SERPL DL<=0.005 MIU/L-ACNC: 0.52 UIU/ML (ref 0.36–4.75)
VITAMIN B12: 675 PG/ML (ref 247–911)
VLDLC SERPL CALC-MCNC: 30 MG/DL (ref 6–40)
WBC NRBC COR # BLD AUTO: 6.2 10*3/UL (ref 4.8–10.8)

## 2020-06-15 NOTE — NUR
TETET PRESENTS SHORT TERM FROM [facility]. WILL NEED COVID SCREENING BEFORE
RETURNING TO [facility] AND THE PATIENT IS MEDICALLY STABLE.

## 2020-06-15 NOTE — NUR
PT IS SHORT TERM AT [facility].  CAN RETURN WHEN MEDICALLY STABLE.  WILL
REQUIRE COVID TESTING, WHICH HAS BEEN ORDERED.

## 2020-06-15 NOTE — NUR
DR. DEVI IN TO SEE PATIENT RE: PLAN OF CARE.  BLOOD CULTURE SET ORDERED AND
IV ANTIBIOTIC ROCEPHIN CHANGED TO MAXEPIME.

## 2020-06-15 NOTE — NUR
TYLENOL GIVEN FOR TEMPERATURE OF 99.5 F RECTAL AND FOR RIGHT SHOULDER PAIN
RATED 6/10. WILL ASSESS EFFECTIVENESS.

## 2020-06-15 NOTE — NUR
TYLENOL EFFECTIVE FOR PATIENT'S PAIN IN LEFT ARM. NOT EFFECTIVE FOR
TEMPERATURE. RECTAL TEMP 99.0 F.

## 2020-06-15 NOTE — NUR
DR. QUIROGA NOTIFIED AT THIS TIME OF PATIENT TEMPERATURE 101.9 AFTER PRN
TYLENOL BEING ADMINISTERED FOR TEMPERATURE .6.
ORDERS RECEIVED AT THIS TIME FOR COOLING BLANKET AND TO ADMINISTER ANOTHER PRN
DOSE OF TYLENOL AT 0830.

## 2020-06-15 NOTE — NUR
JOSE WHITMAN [id number]
 
Please refer to the physician's history and physical for past medical history,
comorbid conditions, and allergies.
   Diagnosis: UTI  SEPSIS
 
   Quinn Score: 13,MODERATE RISK
 
WOUND DESCRIPTIONS:
Wound Number: 1
Location of the wound: Left lower abdominal fold
Thickness: Partial
Size: 1.0cm x 4.7cm x 0.1cm
Tunneling: none
Undermining: none
Sinus Tract: none
Presence of Exudate: Serosanguineous
Amount: Light
Color: Red
Odor: None
Periwound Skin Appearance: Normal
Wound edges: approximated
Pain (associated with wound): none at time of assessment
How does patient state this happened? pt unsure how this happened or when it
happened
   Surface the patient is resting on: Isoflex
 
SKIN PREVENTION RECOMMENDATION:
   1.  Pressure redistribution support surface as appropriate
   2.  Elevate heels
   3.  Remove boots/TEDS every shift and reapply
   4.  Head of bed 30 degrees as tolerated
   5.  Assess nutrition and hydration
   6.  Manage moisture
   7.  Avoid the use of containment devices while in bed
   8.  Use absorptive products on surfaces limit layers of linens on bed
   9.  Turn and reposition every 1-2 hours in bed and every 1 hour in chair as
       tolerated
   10. Weight shifts every 15 minutes while up in chair
   11. Offloading with pillows or device to keep heels elevated off bed
   12. Monitor skin at least every shift
   13. Inspect under medical devices twice a day
 
WOUND TREATMENT RECOMMENDATIONS:
d/c skin tear guidelines
Partial thickness guidelines: Cleanse left lower abdominal fold with nss and
apply sureprep around the wound hydrogel to wound bed and cover with optifoam
gentle every 2 days and prn for soiling.
Patient is from local nursing and will follow up there for wound care upon
discharge.

## 2020-06-16 VITALS — SYSTOLIC BLOOD PRESSURE: 121 MMHG | DIASTOLIC BLOOD PRESSURE: 78 MMHG

## 2020-06-16 VITALS — DIASTOLIC BLOOD PRESSURE: 55 MMHG

## 2020-06-16 VITALS — DIASTOLIC BLOOD PRESSURE: 71 MMHG

## 2020-06-16 VITALS — DIASTOLIC BLOOD PRESSURE: 87 MMHG

## 2020-06-16 LAB
ALBUMIN SERPL-MCNC: 1.8 GM/DL (ref 3.1–4.5)
ALP SERPL-CCNC: 64 U/L (ref 45–117)
ALT SERPL W P-5'-P-CCNC: 22 U/L (ref 12–78)
AST SERPL-CCNC: 48 IU/L (ref 3–35)
BUN SERPL-MCNC: 63 MG/DL (ref 7–24)
CHLORIDE SERPL-SCNC: 98 MMOL/L (ref 98–107)
CREAT SERPL-MCNC: 1.43 MG/DL (ref 0.55–1.02)
ERYTHROCYTE [DISTWIDTH] IN BLOOD BY AUTOMATED COUNT: 16.1 % (ref 0–14.5)
HCT VFR BLD AUTO: 26.1 % (ref 37–47)
MCH RBC QN AUTO: 23.9 PG (ref 27–31)
MCHC RBC AUTO-ENTMCNC: 30.3 G/DL (ref 33–37)
MCV RBC AUTO: 79.1 FL (ref 81–99)
NRBC BLD QL AUTO: 0 % (ref 0–0)
OVALOCYTES BLD QL SMEAR: (no result)
PLATELET # BLD AUTO: 73 10*3/UL (ref 130–400)
PLATELET SUFFICIENCY: (no result)
POTASSIUM SERPL-SCNC: 3.7 MMOL/L (ref 3.5–5.1)
PROT SERPL-MCNC: 5.6 GM/DL (ref 6.4–8.2)
RBC # BLD AUTO: 3.3 10*6/UL (ref 4.1–5.1)
SODIUM SERPL-SCNC: 132 MMOL/L (ref 136–145)
TOTAL CELLS COUNTED: 100 #CELLS
WBC NRBC COR # BLD AUTO: 4.7 10*3/UL (ref 4.8–10.8)

## 2020-06-16 NOTE — NUR
MEDICATED WITH PRN PO TYLENOL FOR TEMP. 100.2 RECTALLY AND CHILLS.  COOLING
BLANKET IS IN PLACE AND TURNED ON AT THIS TIME.

## 2020-06-16 NOTE — NUR
TYLENOL SOMEWHAT EFFECTIVE PER PATIENT.
PATIENT STILL REFUSING BLOOD PRESSURE AT THIS
TIME. WILL COME BACK TO REASSESS.

## 2020-06-16 NOTE — NUR
LAB CALLED NURSE JORGE L FORBES WITH POSITIVE BLOOD CULTURES ON PATIENT AT 0307. I
NOTIFIED DR RIVERS. DR RIVERS STATED PATIENT IS ALREADY ON DAPTOMYCIN WHICH
SHOULD COVER THIS. NO FURTHER ORDERS GIVEN.

## 2020-06-16 NOTE — NUR
PT IS FROM [facility] AND CAN RETURN WHEN MEDICALLY STABLE AND IF COVID
TESTING COMES BACK NEGATIVE.

## 2020-06-16 NOTE — NUR
PATIENT REFUSING TO LET PATIENT ATTENDANT OR MYSELF CHECK BLOOD PRESSURE. SHE
STATED HER ARMS ARE IN PAIN AND IS REFUSING BLOOD PRESSURES AT THE MOMENT. I
WILL ADMINISTER TYLENOL AND TRY AGAIN AFTER REASSESSING EFFECTIVENESS OF
TYLENOL.

## 2020-06-16 NOTE — NUR
SPOKE WITH STAFF AT [facility] WHO STATED PATIENT HAD EGD/COLO WITH DR. DANIELSON ON
6/11, AND THAT PATIENT USED TO HAVE A PICC LINE, WHICH WAS DISCONTINUED IN
EARLY MAY.  DR. DEVI PHONED WITH THIS INFORMATION.

## 2020-06-16 NOTE — NUR
CRITICAL BLOOD CULTURES CALLED TO NURSE VINAYAK ON PATIENT. I NOTIFIED DR MCKINNEY. RESULT WAS GRAM COCCI IN CLUSTERS. NO NEW ORDERS GIVEN.

## 2020-06-16 NOTE — NUR
PHYSICAL THERAPY
 
Screen received [place] [facility] [place]TI/sepsis please consult
PT if pt has a decline in functional status below baseline thank you.
 
 
Armida Mckeon PT

## 2020-06-16 NOTE — NUR
PATIENT'S TEMP 97.8 RECTALLY. COOLING BLANKET TURNED TO MONITOR OPTION.
PATIENT RESTING IN BED QUIETLY. RESPIRATIONS EASY, REGULAR. CALL LIGHT WITHIN
REACH. WILL CONTINUE TO MONITOR.

## 2020-06-16 NOTE — NUR
LSW FAXED UPDATES TO [facility]. PATIENT WILL NEED COVID RESULTS BEFORE
RETURNING TO .

## 2020-06-17 VITALS — SYSTOLIC BLOOD PRESSURE: 118 MMHG | DIASTOLIC BLOOD PRESSURE: 54 MMHG

## 2020-06-17 VITALS — DIASTOLIC BLOOD PRESSURE: 55 MMHG | SYSTOLIC BLOOD PRESSURE: 126 MMHG

## 2020-06-17 VITALS — DIASTOLIC BLOOD PRESSURE: 61 MMHG | SYSTOLIC BLOOD PRESSURE: 110 MMHG

## 2020-06-17 VITALS — SYSTOLIC BLOOD PRESSURE: 122 MMHG | DIASTOLIC BLOOD PRESSURE: 83 MMHG

## 2020-06-17 VITALS — DIASTOLIC BLOOD PRESSURE: 51 MMHG

## 2020-06-17 VITALS — DIASTOLIC BLOOD PRESSURE: 58 MMHG

## 2020-06-17 LAB
ALBUMIN SERPL-MCNC: 1.6 GM/DL (ref 3.1–4.5)
ALP SERPL-CCNC: 59 U/L (ref 45–117)
ALT SERPL W P-5'-P-CCNC: 19 U/L (ref 12–78)
AST SERPL-CCNC: 26 IU/L (ref 3–35)
BUN SERPL-MCNC: 65 MG/DL (ref 7–24)
CHLORIDE SERPL-SCNC: 102 MMOL/L (ref 98–107)
CREAT SERPL-MCNC: 1.34 MG/DL (ref 0.55–1.02)
ERYTHROCYTE [DISTWIDTH] IN BLOOD BY AUTOMATED COUNT: 16 % (ref 0–14.5)
HCT VFR BLD AUTO: 24.3 % (ref 37–47)
MCH RBC QN AUTO: 24 PG (ref 27–31)
MCHC RBC AUTO-ENTMCNC: 30.9 G/DL (ref 33–37)
MCV RBC AUTO: 77.9 FL (ref 81–99)
MICROCYTES BLD QL SMEAR: SLIGHT
NRBC BLD QL AUTO: 0 10*3/UL (ref 0–0)
OVALOCYTES BLD QL SMEAR: (no result)
PLASMA CELLS # BLD MANUAL: 1 % (ref 0–0)
PLATELET # BLD AUTO: 70 10*3/UL (ref 130–400)
PLATELET SUFFICIENCY: (no result)
POTASSIUM SERPL-SCNC: 3.7 MMOL/L (ref 3.5–5.1)
PROT SERPL-MCNC: 5.3 GM/DL (ref 6.4–8.2)
RBC # BLD AUTO: 3.12 10*6/UL (ref 4.1–5.1)
ROULEAUX BLD QL SMEAR: SLIGHT
SCHISTOCYTES BLD QL SMEAR: (no result)
SODIUM SERPL-SCNC: 133 MMOL/L (ref 136–145)
TOTAL CELLS COUNTED: 100 #CELLS
WBC NRBC COR # BLD AUTO: 5.2 10*3/UL (ref 4.8–10.8)

## 2020-06-17 NOTE — NUR
case management visits with patient, she has been referred to [facility], waiting return of covid testing and insurance precert, case
mangement/ will follow

## 2020-06-17 NOTE — NUR
PHYSICAL THERAPY
 
Eval received chart reviewed attempted to see pt however scheduled for MAX to
due + blood cultures, H&H dropping and pt having increase pain BUE MD to
further assess as per nsg using sling RUE. Will follow await MD input/MAX.
 
 
Armida Mckeon PT

## 2020-06-17 NOTE — NUR
DR DEVI IN AND STATES TO START CT PREP FOR IVP ALLERGY. PHARMACY TO
RESEARCH PROTOCOL AND RN TO ORDER.

## 2020-06-17 NOTE — NUR
NOTIFIIED DR HUMPHREY THAT PATIENT IS REFUSING BLOOD PRESSURES. LAST BLOOD
PRESSURE WAS STABLE/GOOD. DR HUMPHREY STATED THAT IF SHE WAS REFUSING THAT IT
WAS OKAY FOR NOW AND WE CAN TRY AGAIN IN THE MORNING.

## 2020-06-17 NOTE — NUR
MEGHA RECEIVED REQUEST FOR DP- PAPERS. ISABELLAW WENT TO SPEAK WITH THE PATIENT
HOWEVER, SHE IS OFF THE FLOOR AT THIS TIME. WILL FOLLOW UP WITH HER AT A LATER
TIME.

## 2020-06-17 NOTE — NUR
Occupational therapy order received and chart reviewed. Attempted to see
patient however patient is scheduled for a MAX and is currently wearing a RUE
sling. Will follow up with MD for further clarification of RUE activity
restrictions.
Thank you.
 
Leana Andrew OTR/L

## 2020-06-17 NOTE — NUR
PATIENT C/O PAIN ALL OVER. RATES 10/10. MEDICATED WITH MORPHINE AT THIS TIME.
WILL CHECK EFFECTIVENESS.

## 2020-06-17 NOTE — NUR
Dressing change to left lower abd fold per physician orders.
Patient tolerate dressing changes without diffcuilty.
Call light within reach and bed in low position.

## 2020-06-17 NOTE — NUR
Patient resting quietly with no c/o discomfort. Respirations easy and regular.
Vital signs stable. No overt distress.
EDIE TREVIÑO

## 2020-06-18 VITALS — DIASTOLIC BLOOD PRESSURE: 55 MMHG

## 2020-06-18 VITALS — DIASTOLIC BLOOD PRESSURE: 54 MMHG

## 2020-06-18 VITALS — DIASTOLIC BLOOD PRESSURE: 72 MMHG

## 2020-06-18 LAB
BUN SERPL-MCNC: 60 MG/DL (ref 7–24)
CHLORIDE SERPL-SCNC: 104 MMOL/L (ref 98–107)
CREAT SERPL-MCNC: 1.17 MG/DL (ref 0.55–1.02)
ERYTHROCYTE [DISTWIDTH] IN BLOOD BY AUTOMATED COUNT: 16.3 % (ref 0–14.5)
HCT VFR BLD AUTO: 25.3 % (ref 37–47)
MCH RBC QN AUTO: 24 PG (ref 27–31)
MCHC RBC AUTO-ENTMCNC: 30.4 G/DL (ref 33–37)
MCV RBC AUTO: 78.8 FL (ref 81–99)
MICROCYTES BLD QL SMEAR: SLIGHT
NRBC BLD QL AUTO: 0 % (ref 0–0)
PLATELET # BLD AUTO: 82 10*3/UL (ref 130–400)
PLATELET SUFFICIENCY: (no result)
POTASSIUM SERPL-SCNC: 4.1 MMOL/L (ref 3.5–5.1)
RBC # BLD AUTO: 3.21 10*6/UL (ref 4.1–5.1)
SODIUM SERPL-SCNC: 135 MMOL/L (ref 136–145)
TOTAL CELLS COUNTED: 100 #CELLS
WBC NRBC COR # BLD AUTO: 5.2 10*3/UL (ref 4.8–10.8)

## 2020-06-18 NOTE — NUR
WHILE WASHING PATIENT, 5 NEW WOUNDS FOUND. MARCIAL, SUPERVISOR, AND DR. OLMOS
NOTIFIED. WOUNDS MEASURED AND PHOTOS TAKEN.

## 2020-06-18 NOTE — NUR
JOSE WHITMAN [id number]
 
Please refer to the physician's history and physical for past medical history,
comorbid conditions, and allergies.
   Diagnosis: UTI  SEPSIS
 
   Quinn Score: 13,MODERATE RISK
 
WOUND DESCRIPTIONS:
Wound Number: 2
Location of the wound: right abdominal fold
Type of wound: MASD
Thickness: Partial
Size: 0.3cm x 3.5cm x 0.1cm
Tunneling: none
Undermining: none
Sinus Tract: none
Presence of Exudate: Serosanguineous
Amount: Light
Color: Red
Odor: None
Periwound Skin Appearance: Normal
Wound edges: approximated
Pain (associated with wound): none at time of assessment
How does patient state this happened? pt unable to state how this happened
 
Wound Number: 3
Location of the wound: left anterior upper thigh
Type of wound: MASD
Thickness: Partial
Size: 0.9cm x 9.0cm x 0.1cm
Tunneling: none
Undermining: none
Sinus Tract: none
Presence of Exudate: Serosanguineous
Amount: Light
Color: Red
Odor: None
Periwound Skin Appearance: Normal
Wound edges: approximated
Pain (associated with wound): none at time of assessment
How does patient state this happened? pt unable to state how this happened
 
Wound Number: 4
Location of the wound: left lateral abdominal fold
Type of wound: MASD
Thickness: Partial
Size: 0.4cm x 8.2cm x 0.1cm
Tunneling: none
Undermining: none
Sinus Tract: none
Presence of Exudate: Serosanguineous
Amount: Light
Color: Red
Odor: None
Periwound Skin Appearance: Normal
Wound edges: approximated
Pain (associated with wound): none at time of assessment
How does patient state this happened? pt unable to state how this happened
 
Wound Number: 5
Location of the wound: intergluteal cleft
Type of wound: MASD
Thickness: Partial
Size: 6.5cm x 1.0cm x 0.1cm
Tunneling: none
Undermining: none
Sinus Tract: none
Presence of Exudate: Serosanguineous
Amount: Light
Color: Red
Odor: None
Periwound Skin Appearance: Normal
Wound edges: approximated
Pain (associated with wound): none at time of assessment
How does patient state this happened? pt unable to state how this happened
 
Wound Number: 6
Location of the wound: left elbow
Thickness: Partial
Size: 0.4cm x 0.3cm x 0.1cm
Tunneling: none
Undermining: none
Sinus Tract: none
Presence of Exudate: Serosanguineous
Amount: Light
Color: Red
Odor: None
Periwound Skin Appearance: Normal
Wound edges: approximated
Pain (associated with wound): none at time of assessment
How does patient state this happened? pt unable to state how this happened
   Surface the patient is resting on: Isoflex
 
SKIN PREVENTION RECOMMENDATION:
   1.  Pressure redistribution support surface as appropriate
   2.  Elevate heels
   3.  Remove boots/TEDS every shift and reapply
   4.  Head of bed 30 degrees as tolerated
   5.  Assess nutrition and hydration
   6.  Manage moisture
   7.  Avoid the use of containment devices while in bed
   8.  Use absorptive products on surfaces limit layers of linens on bed
   9.  Turn and reposition every 1-2 hours in bed and every 1 hour in chair as
       tolerated
   10. Weight shifts every 15 minutes while up in chair
   11. Offloading with pillows or device to keep heels elevated off bed
   12. Monitor skin at least every shift
   13. Inspect under medical devices twice a day
 
WOUND TREATMENT RECOMMENDATIONS:
D/C skin tear guidelines
Heel raiser pro boots to bilateral feet while in bed
Wheelchair cushion when oob
Cleanse right abdominal fold, left abominal fold, left lateral abdominal fold,
left anterior upper thigh with nss pat areas dry then apply nystatin powder
every 8 hours and apply abd pads after powder applications.
Cleanse intergluteal cleft with nss and apply calazime every shift and prn for
soiling.
Skin tear guidelines: Cleanse left elbow with nss and apply sureprep around
the wound hydrogel to wound bed and cover with optifoam gentle.

## 2020-06-18 NOTE — NUR
BLOOD CULTURES CALLED TO ME FROM 15TH AND 16TH. ALL BOTTLES ARE POSITIVE STAPH
AUREUS. NOTIFIED DR OLMOS HE STATED HE WOULD TAKE A LOOK. NO NEW ORDERS GIVEN.

## 2020-06-18 NOTE — NUR
JOSE WHITMAN [id number]
 
Please refer to the physician's history and physical for past medical history,
comorbid conditions, and allergies.
   Diagnosis: UTI  SEPSIS
 
   Quinn Score: 13,MODERATE RISK
 
WOUND DESCRIPTIONS: New skin impairments
Wound Number: 2
Location of the wound: right abdominal fold
Type of wound: MASD
Thickness: Partial
Size: 0.3cm x 3.5cm x 0.1cm
Tunneling: none
Undermining: none
Sinus Tract: none
Presence of Exudate: Serosanguineous
Amount: Light
Color: Red
Odor: None
Periwound Skin Appearance: Normal
Wound edges: approximated
Pain (associated with wound): none at time of assessment
How does patient state this happened? pt unable to state how this happened
 
Wound Number: 3
Location of the wound: left anterior upper thigh
Type of wound: MASD
Thickness: Partial
Size: 0.9cm x 9.0cm x 0.1cm
Tunneling: none
Undermining: none
Sinus Tract: none
Presence of Exudate: Serosanguineous
Amount: Light
Color: Red
Odor: None
Periwound Skin Appearance: Normal
Wound edges: approximated
Pain (associated with wound): none at time of assessment
How does patient state this happened? pt unable to state how this happened
 
Wound Number: 4
Location of the wound: left lateral abdominal fold
Type of wound: MASD
Thickness: Partial
Size: 0.4cm x 8.2cm x 0.1cm
Tunneling: none
Undermining: none
Sinus Tract: none
Presence of Exudate: Serosanguineous
Amount: Light
Color: Red
Odor: None
Periwound Skin Appearance: Normal
Wound edges: approximated
Pain (associated with wound): none at time of assessment
How does patient state this happened? pt unable to state how this happened
 
Wound Number: 5
Location of the wound: intergluteal cleft
Type of wound: MASD
Thickness: Partial
Size: 6.5cm x 1.0cm x 0.1cm
Tunneling: none
Undermining: none
Sinus Tract: none
Presence of Exudate: Serosanguineous
Amount: Light
Color: Red
Odor: None
Periwound Skin Appearance: Normal
Wound edges: approximated
Pain (associated with wound): none at time of assessment
How does patient state this happened? pt unable to state how this happened
   Surface the patient is resting on: Isoflex
 
SKIN PREVENTION RECOMMENDATION:
   1.  Pressure redistribution support surface as appropriate
   2.  Elevate heels
   3.  Remove boots/TEDS every shift and reapply
   4.  Head of bed 30 degrees as tolerated
   5.  Assess nutrition and hydration
   6.  Manage moisture
   7.  Avoid the use of containment devices while in bed
   8.  Use absorptive products on surfaces limit layers of linens on bed
   9.  Turn and reposition every 1-2 hours in bed and every 1 hour in chair as
       tolerated
   10. Weight shifts every 15 minutes while up in chair
   11. Offloading with pillows or device to keep heels elevated off bed
   12. Monitor skin at least every shift
   13. Inspect under medical devices twice a day
 
WOUND TREATMENT RECOMMENDATIONS:
Cleanse intergluteal cleft with nss and apply calazime every shift and prn for
soiling.
Cleanse right abdominal fold, left lateral abdominal fold, left abdominal fold
and left anterior upper thigh with nss pat area dry then apply nystatin powder
every 8 hours and place abd pad over nystatin powder.
D/C skin tear guidelines.
Wheelchair cushion when oob
Heel raiser pro boots to bilatearl feet while in bed.

## 2020-06-18 NOTE — NUR
COVID NEGATIVE AND PT CAN GO TO REHAB SUITES WHEN MEDICALLY STABLE.
PER MEETING THIS AM.  THEY ARE WAITING FOR ID ANTIBIOTICS RECOMENDATIONS WHICH
MAY CHANGE PT GOING TO REHAB SUITES.  WILL CONTINUE TO FOLLOW.

## 2020-06-18 NOTE — NUR
Dressing change to left elbow per physician orders.
Patient tolerate dressing changes without diffcuilty.
Call light within reach and bed in low position.

## 2020-06-18 NOTE — NUR
PHYSICAL THERAPY
 
Per medical team meeting this AM MAX showing possible vegetation, pt with + BC
pt for further testing CT abd and MRI of shld to r/o infectious process will
follow once testing completed.
 
Armida Mckeon PT

## 2020-06-18 NOTE — NUR
Occupational therapy order received and chart reviewed. Patient awaiting a
right shoulder MRI due to increased pain per chart review. Will follow up with
MD on activity restrictions of the right UE in AM. Thank you.
 
Leana Andrew OTR/L

## 2020-06-18 NOTE — NUR
NOTIFIED DR NANCE'S ANSWERING SERVICE OF BLOOD CULTURES. SHE STATED SHE
WOULD HAVE DR NANCE CALL SO THAT HE COULD SPEAK WITH US ABOUT BLOOD CULTURES
PER DR OLMOS'S REQUEST.

## 2020-06-18 NOTE — NUR
MEDICATED WITH IV MORPHINE AS ORDERED PER PT REQUEST FOR C/O PAIN TO RIGHT
SHOULDER RATED 10+/10. MRI IN AM TO [facility] C/O PAIN.

## 2020-06-19 VITALS — SYSTOLIC BLOOD PRESSURE: 114 MMHG | DIASTOLIC BLOOD PRESSURE: 54 MMHG

## 2020-06-19 VITALS — DIASTOLIC BLOOD PRESSURE: 60 MMHG

## 2020-06-19 VITALS — DIASTOLIC BLOOD PRESSURE: 52 MMHG

## 2020-06-19 LAB
ALBUMIN SERPL-MCNC: 1.9 GM/DL (ref 3.1–4.5)
ALP SERPL-CCNC: 67 U/L (ref 45–117)
ALT SERPL W P-5'-P-CCNC: 8 U/L (ref 12–78)
AST SERPL-CCNC: 18 IU/L (ref 3–35)
BUN SERPL-MCNC: 60 MG/DL (ref 7–24)
CHLORIDE SERPL-SCNC: 104 MMOL/L (ref 98–107)
CREAT SERPL-MCNC: 1.31 MG/DL (ref 0.55–1.02)
ERYTHROCYTE [DISTWIDTH] IN BLOOD BY AUTOMATED COUNT: 16.3 % (ref 0–14.5)
HCT VFR BLD AUTO: 25.4 % (ref 37–47)
MCH RBC QN AUTO: 24 PG (ref 27–31)
MCHC RBC AUTO-ENTMCNC: 29.9 G/DL (ref 33–37)
MCV RBC AUTO: 80.1 FL (ref 81–99)
MICROCYTES BLD QL SMEAR: SLIGHT
NRBC BLD QL AUTO: 0 10*3/UL (ref 0–0)
OVALOCYTES BLD QL SMEAR: (no result)
PLATELET # BLD AUTO: 94 10*3/UL (ref 130–400)
PLATELET SUFFICIENCY: (no result)
PMV BLD AUTO: 13.4 FL (ref 9.6–12.3)
POTASSIUM SERPL-SCNC: 4.1 MMOL/L (ref 3.5–5.1)
PROT SERPL-MCNC: 5.6 GM/DL (ref 6.4–8.2)
RBC # BLD AUTO: 3.17 10*6/UL (ref 4.1–5.1)
ROULEAUX BLD QL SMEAR: SLIGHT
SCHISTOCYTES BLD QL SMEAR: (no result)
SODIUM SERPL-SCNC: 135 MMOL/L (ref 136–145)
TOTAL CELLS COUNTED: 100 #CELLS
WBC NRBC COR # BLD AUTO: 9.1 10*3/UL (ref 4.8–10.8)

## 2020-06-19 NOTE — NUR
MEDICATION APPEARS EFFECTIVE. RESPS EASY AND NON LABORED. NO S/S OF DISTRESS
NOTED. CALL LIGHT WITHIN REACH. PT RESTING IN BED

## 2020-06-19 NOTE — NUR
PT YELLING OUT THAT SHE HURTS ALL OVER. WHEN ASKED SHE STATES ITS
EXCRUCITAING. MEDICATED PER ORDER. WILL MONITOR FOR RELIEF. RESPS EASY AND NON
LABORED. VSS. CALL LIGHT WITHIN REACH. BED ALARM ON.

## 2020-06-19 NOTE — NUR
PT SLEEPING. RESPS EASY AND NON LABORED. NO S/S OF DISTRESS. VSS. CALL LIGHT
WITHIN REACH. BED ALARM ON. PT MOANS/GROANS/GRIMACES
EVEN WHEN SLIGHTLY TOUCHED. REPOSITIONED FOR COMFORT PRN.

## 2020-06-19 NOTE — NUR
Occupational therapy order received, chart reviewed, and have attempted to see
patient over past few days however have been unable due to tests/medical
status. Spoke with the nurse this morning, patient to be taken for an MRI of
the R shoulder to rule out septic arthritis. Nursing had just given her pain
meds, still having 10/10 shoulder pain, and is not appropriate for an eval.
Will follow up with patient and MD at a later date. Thank you.
 
Leana Andrew, OTR/L

## 2020-06-19 NOTE — NUR
PHYSICAL THERAPY
 
Eval received chart reviewed have been attempting to see pt over the past few
days, but due to tests/medical status pt has not been appropriate. Recent MAX
shows possible vegetation of mitral valve, CT abdomen shows cirrhotic liver
with portal hypertension,abdominal ascites but no infection. Pt has had +
blood and urine cultures and having multiple tests to find source of
infection. Spoke with nurse today pt to have MRI of R should due to
intractable pain r/o septic joint. Per nsg pt being medicated now for pain and
10/10 not appropriate with futher testing/MRI today. Will follow at a later
date.
 
Armida Mckeon PT

## 2020-06-19 NOTE — NUR
PT YELLING OUT THAT SHE HURTS ALL OVER AND ITS EXCRUCIATING. MEDICATED PER
ORDER. WILL MONITOR FOR RELIEF. RESPS EASY AND NON LABORED. SITTING UP IN BED.
CALL LIGHT WITHIN REACH. BED ALARM ON.

## 2020-06-19 NOTE — NUR
DR AUSTIN NOTIFIED THAT PT IS YELLING/MOANING/GROANING THAT SHE IS IN SEVERE
PAIN. STATES HE WILL PUT IN ORDERS

## 2020-06-19 NOTE — NUR
PT RESTING IN BED. RESPS EASY AND NON LABORED. VSS. WHITE BOARD UPDATED. CALL
LIGHT WITHIN REACH. BED ALARM ON. ANASARCA NOTED. R ARM SLING. Tunespeak PATENT
DARK [place]. 2L NC. PT IS CONFUSED AND UNABLE TO ANSWER QUESTIONS
APPROPRAITELY. YELLS OUT IN PAIN IF TOUCHED. WHEN ASKED IF SHE WOULD LIKE PAIN
MEDICATION SHE SHAKES HEAD YES.

## 2020-06-19 NOTE — NUR
PT MEDICATED PER DR GONSALVES ORDER. WILL CONTINUE TO MONITOR FOR RELIEF.
RESPS EASY AND NON LABORED. VSS. BED ALARM ON. CALL LIGHT WITHIN REACH.

## 2020-06-20 VITALS — SYSTOLIC BLOOD PRESSURE: 121 MMHG | DIASTOLIC BLOOD PRESSURE: 60 MMHG

## 2020-06-20 VITALS — DIASTOLIC BLOOD PRESSURE: 64 MMHG | SYSTOLIC BLOOD PRESSURE: 129 MMHG

## 2020-06-20 VITALS — DIASTOLIC BLOOD PRESSURE: 48 MMHG

## 2020-06-20 VITALS — DIASTOLIC BLOOD PRESSURE: 61 MMHG

## 2020-06-20 NOTE — NUR
RESTING IN BED WITH EYES CLOSED; AROUSES EASILY TO VERBAL STIMULI.  02 INTACT
AT 3 LITERS; PULSE %.  NO DISTRESS NOTED; CALL LIGHT WITHIN REACH.

## 2020-06-20 NOTE — NUR
RECEIVED REPORT FROM PREVIOUS NURSE, BARBER. PT DAUGHTER IN ROOM AT THIS TIME
VISITING. CALL LIGHT WITHIN REACH, WILL CONTINUE TO MONITOR

## 2020-06-20 NOTE — NUR
PT IS REPOSITIONED AT THIS TIME. PT IS COMPLAINING OF RIGHT ARM PAIN AND LEFT
WRIST PAIN AND IS UNABLE TO HELP ASSIST WHEN TRYING TO REPOSITION HER. PILLOW
PLACE UNDER HER LEFT SIDE MAKING HER SHIFT THE WEIGHT TOWARD THE RIGHT SIDE.
CALL LIGHT WITHIN REACH, WILL CONTINUE TO MONITOR none

## 2020-06-20 NOTE — NUR
PT DENIES BEING REPOSITIONED AT THIS TIME DUE TO HAVING ARM PAIN. SHE IS
UNABLE TO HELP WITH MOVING HERSELF AND IS TEARFUL AND DOES NOT WANT TO BE
MOVED

## 2020-06-21 VITALS — SYSTOLIC BLOOD PRESSURE: 149 MMHG | DIASTOLIC BLOOD PRESSURE: 78 MMHG

## 2020-06-21 VITALS — SYSTOLIC BLOOD PRESSURE: 144 MMHG | DIASTOLIC BLOOD PRESSURE: 70 MMHG

## 2020-06-21 VITALS — DIASTOLIC BLOOD PRESSURE: 65 MMHG

## 2020-06-21 VITALS — DIASTOLIC BLOOD PRESSURE: 70 MMHG

## 2020-06-21 NOTE — NUR
PRN TYLENOL PO GIVEN FOR COMPLAINTS OF A HEAD ACHE AND BACK ACHE. WILL
CONTINUE TO MONITOR, CALL LIGHT WITHIN REACH

## 2020-06-21 NOTE — NUR
PRN MORPHINE IV GIVEN FOR "ALL OVER" PAIN. CALL LIGHT WITHIN REACH, WILL
CONTINUE TO MONITOR FOR EFFECTIVENESS

## 2020-06-21 NOTE — NUR
PT IV ACCESS IS NO LONGER ACTIVE. PT WAS COMPLAINING OF PAIN WHILE IV LASIX
WAS BEING GIVEN. I TOOK OFF THE TAPE TO ASSESS TO IV SITE AND THE SKIN LOOKED
LIKE IT WAS INFILTRATED. IV REMOVED, WILL ATTEMPT TO GET ANOTHER LINE IN.

## 2020-06-21 NOTE — NUR
DR MUHAMMAD CALLED PER PT DAUGHTER REQUEST OF HAVING MULTIPLE QUESTIONS. DR MUHAMMAD STATES SHE WILL BE RIGHT UP

## 2020-06-21 NOTE — NUR
PO MEDICATION TAKEN WITH EASE IN APPLESAUCE & A SIP OF WATER. PATIENT DOES NOT
OPEN EYES. MOANS WHENEVER TOUCHED. WILL ANSWER QUESTIONS. ORIENTED TO SELF.
BED ALARM FOR SAFETY.

## 2020-06-21 NOTE — NUR
DR MUHAMMAD INFORMED OF NO IV ACCESS IN THE PATIENT. SHE STATES TO TRY TO GET
ONE IN AND IF NO LUCK TO LET HER KNOW

## 2020-06-22 VITALS — DIASTOLIC BLOOD PRESSURE: 66 MMHG

## 2020-06-22 VITALS — DIASTOLIC BLOOD PRESSURE: 81 MMHG

## 2020-06-22 VITALS — DIASTOLIC BLOOD PRESSURE: 66 MMHG | SYSTOLIC BLOOD PRESSURE: 148 MMHG

## 2020-06-22 LAB
ALBUMIN SERPL-MCNC: 1.7 GM/DL (ref 3.1–4.5)
ALP SERPL-CCNC: 66 U/L (ref 45–117)
ALT SERPL W P-5'-P-CCNC: < 6 U/L (ref 12–78)
AST SERPL-CCNC: 15 IU/L (ref 3–35)
BASOPHILS # BLD AUTO: 0 10*3/UL (ref 0–0.1)
BASOPHILS NFR BLD AUTO: 0 % (ref 0–1)
BUN SERPL-MCNC: 37 MG/DL (ref 7–24)
CHLORIDE SERPL-SCNC: 112 MMOL/L (ref 98–107)
CREAT SERPL-MCNC: 1 MG/DL (ref 0.55–1.02)
EOSINOPHIL # BLD AUTO: 0.1 10*3/UL (ref 0–0.4)
EOSINOPHIL # BLD AUTO: 0.6 % (ref 1–4)
ERYTHROCYTE [DISTWIDTH] IN BLOOD BY AUTOMATED COUNT: 17.4 % (ref 0–14.5)
HCT VFR BLD AUTO: 27.4 % (ref 37–47)
LYMPHOCYTES # BLD AUTO: 0.8 10*3/UL (ref 1.3–4.4)
LYMPHOCYTES NFR BLD AUTO: 9.1 % (ref 27–41)
MCH RBC QN AUTO: 23.8 PG (ref 27–31)
MCHC RBC AUTO-ENTMCNC: 30.3 G/DL (ref 33–37)
MCV RBC AUTO: 78.5 FL (ref 81–99)
MONOCYTES # BLD AUTO: 0.2 10*3/UL (ref 0.1–1)
MONOCYTES NFR BLD MANUAL: 2.9 % (ref 3–9)
NEUT #: 7.3 10*3/UL (ref 2.3–7.9)
NEUT %: 86.7 % (ref 47–73)
NRBC BLD QL AUTO: 0 % (ref 0–0)
PLATELET # BLD AUTO: 122 10*3/UL (ref 130–400)
PMV BLD AUTO: 11.9 FL (ref 9.6–12.3)
POTASSIUM SERPL-SCNC: 3.7 MMOL/L (ref 3.5–5.1)
PROT SERPL-MCNC: 6.1 GM/DL (ref 6.4–8.2)
RBC # BLD AUTO: 3.49 10*6/UL (ref 4.1–5.1)
SODIUM SERPL-SCNC: 146 MMOL/L (ref 136–145)
WBC NRBC COR # BLD AUTO: 8.4 10*3/UL (ref 4.8–10.8)

## 2020-06-22 NOTE — NUR
JOSE WHITMAN [id number]
 
Please refer to the physician's history and physical for past medical history,
comorbid conditions, and allergies.
   Diagnosis: UTI  SEPSIS
 
   Quinn Score: 13,MODERATE RISK
 
WOUND DESCRIPTIONS:
Wound Number: 1
Location of the wound: Left lower abdominal fold
Thickness: Partial
Size: 0.5cm x 1.5cm x 0.1cm
Tunneling: none
Undermining: none
Sinus Tract: none
Presence of Exudate: Serosanguineous
Amount: Light
Color: Red
Odor: None
Periwound Skin Appearance: Normal
Wound edges: approximated
Pain (associated with wound): none at time of assessment
How does patient state this happened? pt unsure how this happened or when it
happened
 
Wound Number: 2
Location of the wound: right abdominal fold
Type of wound: MASD
Thickness: Partial
Size: 0.3cm x 0.9cm x 0.1cm
Tunneling: none
Undermining: none
Sinus Tract: none
Presence of Exudate: Serosanguineous
Amount: Light
Color: Red
Odor: None
Periwound Skin Appearance: Normal
Wound edges: approximated
Pain (associated with wound): none at time of assessment
How does patient state this happened? pt unable to state how this happened
 
Wound Number: 3
Location of the wound: left anterior upper thigh
Type of wound: MASD
Thickness: Partial
Size: 0.5cm x 8.5cm x 0.1cm
Tunneling: none
Undermining: none
Sinus Tract: none
Presence of Exudate: Serosanguineous
Amount: Light
Color: Red
Odor: None
Periwound Skin Appearance: Normal
Wound edges: approximated
Pain (associated with wound): none at time of assessment
How does patient state this happened? pt unable to state how this happened
 
Wound Number: 4
Location of the wound: left lateral abdominal fold
Type of wound: MASD
Thickness: Partial
Size: 0.4cm x 7.8cm x 0.1cm
Tunneling: none
Undermining: none
Sinus Tract: none
Presence of Exudate: Serosanguineous
Amount: Light
Color: Red
Odor: None
Periwound Skin Appearance: Normal
Wound edges: approximated
Pain (associated with wound): none at time of assessment
How does patient state this happened? pt unable to state how this happened
 
Wound Number: 5
Location of the wound: intergluteal cleft
Type of wound: MASD
Thickness: Partial
Size: 5.8cm x 1.0cm x 0.1cm
Tunneling: none
Undermining: none
Sinus Tract: none
Presence of Exudate: Serosanguineous
Amount: Light
Color: Red
Odor: None
Periwound Skin Appearance: Normal
Wound edges: approximated
Pain (associated with wound): none at time of assessment
How does patient state this happened? pt unable to state how this happened
 
Wound Number: 6
Location of the wound: left elbow
Thickness: Partial
Size: 0.6cm x 0.3cm x 0.1cm
Tunneling: none
Undermining: none
Sinus Tract: none
Presence of Exudate: Serosanguineous
Amount: Light
Color: Red
Odor: None
Periwound Skin Appearance: Normal
Wound edges: approximated
Pain (associated with wound): none at time of assessment
How does patient state this happened? pt unable to state how this happened
 
Wound Number: 7
Location of the wound: left elbow proximal
Thickness: Partial
Size: 1.0cm x 2.5cm x 0.1cm
Tunneling: none
Undermining: none
Sinus Tract: none
Presence of Exudate: Serosanguineous
Amount: Light
Color: Red
Odor: None
Periwound Skin Appearance: Normal
Wound edges: approximated
Pain (associated with wound): none at time of assessment
How does patient state this happened? pt unable to state how this happened
 
Wound Number: 8
Location of the wound: right upper anterior thigh distal
Thickness: Partial
Size: 0.4cm x 0.8cm x 0.1cm
Tunneling: none
Undermining: none
Sinus Tract: none
Presence of Exudate: Serosanguineous
Amount: Light
Color: Red
Odor: None
Periwound Skin Appearance: Normal
Wound edges: approximated
Pain (associated with wound): none at time of assessment
How does patient state this happened? pt unable to state how this happened
 
Wound Number: 9
Location of the wound: right upper anterior thigh proximal
Thickness: Partial
Size: 0.5cm x 3.0cm x 0.1cm
Tunneling: none
Undermining: none
Sinus Tract: none
Presence of Exudate: Serosanguineous
Amount: Light
Color: Red
Odor: None
Periwound Skin Appearance: Normal
Wound edges: approximated
Pain (associated with wound): none at time of assessment
How does patient state this happened? pt unable to state how this happened
 
Wound Number: 10
Location of the wound: lower middle abdominal fold
Thickness: Partial
Size: 0.4cm x 1.0cm x 0.1cm
Tunneling: none
Undermining: none
Sinus Tract: none
Presence of Exudate: Serosanguineous
Amount: Light
Color: Red
Odor: None
Periwound Skin Appearance: Normal
Wound edges: approximated
Pain (associated with wound): none at time of assessment
How does patient state this happened? pt unable to state how this happened
   Surface the patient is resting on: Isoflex
 
SKIN PREVENTION RECOMMENDATION:
   1.  Pressure redistribution support surface as appropriate
   2.  Elevate heels
   3.  Remove boots/TEDS every shift and reapply
   4.  Head of bed 30 degrees as tolerated
   5.  Assess nutrition and hydration
   6.  Manage moisture
   7.  Avoid the use of containment devices while in bed
   8.  Use absorptive products on surfaces limit layers of linens on bed
   9.  Turn and reposition every 1-2 hours in bed and every 1 hour in chair as
       tolerated
   10. Weight shifts every 15 minutes while up in chair
   11. Offloading with pillows or device to keep heels elevated off bed
   12. Monitor skin at least every shift
   13. Inspect under medical devices twice a day
 
WOUND TREATMENT RECOMMENDATIONS:
Continue calazime every shift and prn for soing soiling to intergluteal cleft
Clarify nystatin powder every 8 hours to left lower abdominal fold, right
abdominal fold, left anterior upper thigh, left lateral abdominal fold,right
upper anterior thigh proximal, right upper anterior thigh distal, lower middle
abdominal fold, left elbow distal, left elbow proximal after cleansing with
nss and pat areas dry. Please place abd pad or pillow cases in between folds
after nystatin powder application.

## 2020-06-22 NOTE — NUR
PHYSICAL THERAPY
 
Physical Therapy evaluation completed on 5E with full evaluation to follow.
High Complexity PT evaluation per chart review and evaluation, [id number].
Recommend physical therapy per plan of care and SNF upon discharge.  Thank you
for this referral. Italia Sanders,PT,DPT

## 2020-06-22 NOTE — NUR
PRN TYLENOL GIVEN PO AT THIS TIME FOR GENERALIZED PAIN AND DISCOMFORT, PATIENT
CRYING OUT AND APPEARS UNCOMFORTABLE EVEN AFTER REPOSITIONING. WILL CONTINUE
TO MONITOR, CALL LIGHT WITHIN REACH

## 2020-06-22 NOTE — NUR
Occupational Therapy evaluation completed on 5E with full evaluation to
follow.  Recommend occupational therapy per plan of care and SNF upon
discharge.  Thank you for this referral.
Pilar Rodríguez OTR/L

## 2020-06-23 VITALS — DIASTOLIC BLOOD PRESSURE: 90 MMHG

## 2020-06-23 VITALS — DIASTOLIC BLOOD PRESSURE: 65 MMHG

## 2020-06-23 VITALS — DIASTOLIC BLOOD PRESSURE: 78 MMHG

## 2020-06-23 LAB
BASOPHILS # BLD AUTO: 0 10*3/UL (ref 0–0.1)
BASOPHILS NFR BLD AUTO: 0 % (ref 0–1)
BUN SERPL-MCNC: 36 MG/DL (ref 7–24)
CHLORIDE SERPL-SCNC: 111 MMOL/L (ref 98–107)
CREAT SERPL-MCNC: 0.8 MG/DL (ref 0.55–1.02)
EOSINOPHIL # BLD AUTO: 0 10*3/UL (ref 0–0.4)
EOSINOPHIL # BLD AUTO: 0.6 % (ref 1–4)
ERYTHROCYTE [DISTWIDTH] IN BLOOD BY AUTOMATED COUNT: 17.7 % (ref 0–14.5)
HCT VFR BLD AUTO: 25.7 % (ref 37–47)
LYMPHOCYTES # BLD AUTO: 0.6 10*3/UL (ref 1.3–4.4)
LYMPHOCYTES NFR BLD AUTO: 10.1 % (ref 27–41)
MCH RBC QN AUTO: 23.7 PG (ref 27–31)
MCHC RBC AUTO-ENTMCNC: 29.2 G/DL (ref 33–37)
MCV RBC AUTO: 81.1 FL (ref 81–99)
MONOCYTES # BLD AUTO: 0.2 10*3/UL (ref 0.1–1)
MONOCYTES NFR BLD MANUAL: 3 % (ref 3–9)
NEUT #: 5.4 10*3/UL (ref 2.3–7.9)
NEUT %: 86 % (ref 47–73)
NRBC BLD QL AUTO: 0 % (ref 0–0)
PLATELET # BLD AUTO: 118 10*3/UL (ref 130–400)
PMV BLD AUTO: 11 FL (ref 9.6–12.3)
POTASSIUM SERPL-SCNC: 3.2 MMOL/L (ref 3.5–5.1)
RBC # BLD AUTO: 3.17 10*6/UL (ref 4.1–5.1)
SODIUM SERPL-SCNC: 145 MMOL/L (ref 136–145)
WBC NRBC COR # BLD AUTO: 6.3 10*3/UL (ref 4.8–10.8)

## 2020-06-23 NOTE — NUR
PT DAUGHTER IN THE ROOM AND TALKED ABOUT PATIENTS STATUS AND PLAN OF CARE.
 
PT IS ALERT AND ORIENTED. STILL NOT OPENING HER EYES WHEN SHE TALKS, BUT
APPERARS TO BE MORE ALERT THAN PAST COUPLE DAYS

## 2020-06-23 NOTE — NUR
[facility] IS STILL ATTEMPTING TO SPEAK WITH THE FAMILY. PATIENT WOULD BE ABLE TO
RETURN TO [facility] AT DISCHARGE IF THE FAMILY IS NOT WILLING TO PAY THE BEHOLD
RATES.

## 2020-06-23 NOTE — NUR
PATIENT RESTING IN BED IN A POSITION OF COMFORT WITH EYES CLOSED AT THIS TIME,
NO LONGER GRIMACING, MOANING, OR CRYING OUT. CALL LIGHT WITHIN REACH, WILL
CONTINUE TO MONITOR.

## 2020-06-23 NOTE — NUR
PER SIOBHAN- THE PATIENTS FAMILY IS NOT WANTING TO PAY THE BEDHOLD FEES FOR
RS. PATIENT WILL BE ABLE TO RETURN TO [facility] INSTEAD OF [facility].

## 2020-06-23 NOTE — NUR
OT NOTE
Pt seen this date for 1:1 for 25 min therapy session and was
identified by name and . Pt presented to therapy supine in bed w 3LO2 via
NC and R shoulder sling which was not donned properly. Pt rated general
all over pain at a 10 on a 0/10 pain scale but was agreeable to therapy. Pt
required Max A to adjust sling for proper fit w verbalized pain and facial
grimace. Pt then required Max x2 during pt care to roll in bed to L and R and
required Mod x2 to sustain side lying position w nursing staff present. Pt
displayed signs of distress w touch and kept eyes closed most of the session.
Pt then required Max A x3 to perform supine to sit at EOB and constant verbal
cues. Pt maintained seated at EOB for aprox 2-3 min w CGA. Noted pt fatigues
quickly.  Pt then required Max A x3 to return to supine in bed. At end of
session pt supine in bed w 3LO2 via NC and call light in reach and bed alarm
activated for safety . Continue w recommended D/C plan to SNF.
 
 EVETTE Ervin/ALMITA Samson/NADIA

## 2020-06-23 NOTE — NUR
PHYSICAL THERAPY
 
Patient seen this pm 1:1 for therapy visit and was supine in bed with
continuous O2-3L via NC upon therapist arrival. Patient identified by name /
 and c/o of R shoulder pain 10/10, with R shoulder sling hanging off her
arm. OT assistant was also present for observation only as patient educated on
proper sling fit as therapist readjusted sling for proper, comfortable fit.
Patient stated she was exhausted from just receiving patient care, however
transfers supine to sit EOB with MAX A x 3. Patient tolerated static EOB sit x
3-4 minutes, CGA, reporting no new c/o's at this time. Patient fatigues
quickly and requested transfer back to supine in bed, MAX A x 3. Patient
needed therapist asisst x 2 for bed positioning and remained in bed with call
light, tray table, bed alarm / bed side rails raised for safety. Will continue
per POC as tolerated, total treatment time 13 minutes.   Gen Sharma, PTA

## 2020-06-23 NOTE — NUR
PATIENT DENIED PAIN AT THIS TIME AFTER PRN TYLENOL ADMINISTERED BUT CONTINUES
TO CRY OUT WHEN TOUCHED. CALL LIGHT WITHIN REACH WILL CONTINUE TO MONITOR.

## 2020-06-23 NOTE — NUR
PATIENT ASSESSMENT COMPLETED AT THIS TIME WITHOUT INCIDENT. PATIENT LYING IN
BED IN SEMI-FOWLERS POSITION, MOANING WHEN ASKED IF SHE WAS HAVING PAIN OR
WOULD LIKE TO BE REPOSITIONED SHE DENIED BOTH. SLING TO RIGHT ARM INTACT. LEFT
DUAL LUMEN PICC LINE DRESSING INTACT, BOTH LUMENS HAVE GOOD BLOOD RETURN AND
FLUSH EASILY, IV NAFCILLIN INFUSING WELL AT THIS TIME. CALL LIGHT WITHIN
REACH, WILL CONTINUE TO MONITOR.

## 2020-06-23 NOTE — NUR
ASSESSMENT COMPLETE. PT IS TEARFUL AT THIS TIME UPON ASSESSMENT. WHEN PATIENT
IS NOT BEING TOUCHED THERE IS LESS MOANING. BREAKFAST ORDERED. CALL LIGHT
WITHIN REACH, WILL CONTINUE TO MONITOR

## 2020-06-23 NOTE — NUR
PRN IV DILAUDID GIVEN AT THIS TIME VIA LEFT PICC LINE FOR PATIENT COMPLAINT OF
BACK PAIN, PATIENT MOANING AND CRYING OUT. WHEN ASKED ABOUT PAIN LOCATION
AND SEVERITY PATIENT KEPT SAYING "MY BACK, MY BACK" BUT WOULD NOT RATE HER
PAIN NUMERICALLY, USING THE FACES SCALE PAIN WOULD BE A 9-10. A&O X2, CALL
LIGHT WITHIN REACH, WILL CONTINUE TO MONITOR.

## 2020-06-23 NOTE — NUR
[facility]OLD. THEY
ARE UNABLE TO CONTINUE TO HOLD THE PATIENTS BED AT . WILL CONTINUE TO
FOLLOW.

## 2020-06-24 VITALS — DIASTOLIC BLOOD PRESSURE: 58 MMHG

## 2020-06-24 VITALS — DIASTOLIC BLOOD PRESSURE: 56 MMHG

## 2020-06-24 VITALS — SYSTOLIC BLOOD PRESSURE: 114 MMHG | DIASTOLIC BLOOD PRESSURE: 86 MMHG

## 2020-06-24 LAB
ALBUMIN SERPL-MCNC: 1.8 GM/DL (ref 3.1–4.5)
ALP SERPL-CCNC: 63 U/L (ref 45–117)
ALT SERPL W P-5'-P-CCNC: < 6 U/L (ref 12–78)
AST SERPL-CCNC: 14 IU/L (ref 3–35)
BASOPHILS # BLD AUTO: 0 10*3/UL (ref 0–0.1)
BASOPHILS NFR BLD AUTO: 0.1 % (ref 0–1)
BUN SERPL-MCNC: 33 MG/DL (ref 7–24)
CHLORIDE SERPL-SCNC: 110 MMOL/L (ref 98–107)
CREAT SERPL-MCNC: 0.92 MG/DL (ref 0.55–1.02)
EOSINOPHIL # BLD AUTO: 0.1 10*3/UL (ref 0–0.4)
EOSINOPHIL # BLD AUTO: 1 % (ref 1–4)
ERYTHROCYTE [DISTWIDTH] IN BLOOD BY AUTOMATED COUNT: 18.4 % (ref 0–14.5)
HCT VFR BLD AUTO: 28.7 % (ref 37–47)
LYMPHOCYTES # BLD AUTO: 0.7 10*3/UL (ref 1.3–4.4)
LYMPHOCYTES NFR BLD AUTO: 7.7 % (ref 27–41)
MCH RBC QN AUTO: 23.8 PG (ref 27–31)
MCHC RBC AUTO-ENTMCNC: 28.6 G/DL (ref 33–37)
MCV RBC AUTO: 83.4 FL (ref 81–99)
MONOCYTES # BLD AUTO: 0.3 10*3/UL (ref 0.1–1)
MONOCYTES NFR BLD MANUAL: 3.5 % (ref 3–9)
NEUT #: 7.7 10*3/UL (ref 2.3–7.9)
NEUT %: 87 % (ref 47–73)
NRBC BLD QL AUTO: 0 10*3/UL (ref 0–0)
PLATELET # BLD AUTO: 139 10*3/UL (ref 130–400)
PMV BLD AUTO: 10.4 FL (ref 9.6–12.3)
POTASSIUM SERPL-SCNC: 3.2 MMOL/L (ref 3.5–5.1)
PROT SERPL-MCNC: 6.6 GM/DL (ref 6.4–8.2)
RBC # BLD AUTO: 3.44 10*6/UL (ref 4.1–5.1)
SODIUM SERPL-SCNC: 146 MMOL/L (ref 136–145)
WBC NRBC COR # BLD AUTO: 8.9 10*3/UL (ref 4.8–10.8)

## 2020-06-24 NOTE — NUR
PRN DILAUDID GIVEN AT THIS TIME FOR "SEVERE PAIN" PER PATIENT AFTER BEING
BATHED, TURNED, AND REPOSITIONED. PATIENT CRY, GRIMACING, AND YELLING OUT. A&O
X1. CALL LIGHT WITHIN REACH, WILL CONTINUE TO MONITOR.

## 2020-06-24 NOTE — NUR
PHYSICAL THERAPY
 
Patient seen this am 1;1 for therapy visit and was resting supine in bed upon
therapist arrival. Patient identified by name /  and joined by OT assistant
who was present for observation this session. Patient reports increased c/o of
low back pain this morning, but unable to rate pain on 0-10 scale. Patient
presents with R shoulder sling and transfers supine to sit EOB with MAX A x 3.
Patient also needed repeated MAX v/c's for active participation, including
increased focus on task as patient was consumed by pain c/o which had a
direct affect on her performance. Patient tolerated approx 10 minutes static
EOB sit, CGA and was able to complete minimal AROM of B seated LAQ ex, x 3
reps each. Patient returned to supine in bed, MAX A x 3 and remained with call
light, tray table, bed side rails raised, bed alarm for safety. Will continue
per POC as tolerated, total treatment time 15 minutes.   Gen Sharma, PTA

## 2020-06-24 NOTE — NUR
PATIENT RESTING IN BED IN A POSITION OF COMFORT WITH EYES CLOSED, OCCASIONALLY
MOANING AND CRYING OUT. CALL LIGHT WITHIN REACH, WILL CONTINUE TO MONITOR.

## 2020-06-24 NOTE — NUR
OT NOTE
Pt seen this date for 25 min 1:1 therapy session. Upon arrival pt supine in
bed w 3LO2 via NC and bed alarm activated. Pt presented to therapy asleep and
was dificult to wake initially. Noted RUE sling propperly donned this date. Pt
reported severe back pain which she was unable to rate on a scale of 0-10.  Pt
was unable to reach feet to don socks d/t decreased forward flexion and as a
result required Max A to don socks. Pt then required Max A x3 to transfer from
supine to seated of EOB and verbalized pain in lower back throuought session.
Pt remained seated at EOB for 10 min w SBA and noted L side lateral lean and
poor posture. Pt unable to self correct posture and L lateral lean d/t painful
movements resulting in occasional Velia.  Pt began to fatigue and required Max
A x3 to return to supine in bed. Pt then required Max A x2 to complete bed
mobility for proper alignment while supine in bed. At end of session pt supine
in bed w bed alarm activated and 3LO2 via NC. Continue w current D/C to SNF.
 
EVETTE Ervin/ALMITA Samson/NADIA

## 2020-06-24 NOTE — NUR
PATIENT RESTING IN BED AT THIS TIME WITH EYES CLOSED, RESPIRATIONS EASY AND
NON-LABORED AT THIS TIME. PRN DILAUDID APPEARS TO HAVE BEEN EFFECTIVE FOR PAIN
RELIEF. CALL LIGHT WITHIN REACH, WILL CONTINUE TO MONITOR.

## 2020-06-25 VITALS — DIASTOLIC BLOOD PRESSURE: 45 MMHG

## 2020-06-25 VITALS — DIASTOLIC BLOOD PRESSURE: 45 MMHG | SYSTOLIC BLOOD PRESSURE: 130 MMHG

## 2020-06-25 VITALS — DIASTOLIC BLOOD PRESSURE: 61 MMHG

## 2020-06-25 VITALS — SYSTOLIC BLOOD PRESSURE: 127 MMHG | DIASTOLIC BLOOD PRESSURE: 46 MMHG

## 2020-06-25 LAB
BASOPHILS # BLD AUTO: 0 10*3/UL (ref 0–0.1)
BASOPHILS NFR BLD AUTO: 0 % (ref 0–1)
BUN SERPL-MCNC: 32 MG/DL (ref 7–24)
CHLORIDE SERPL-SCNC: 106 MMOL/L (ref 98–107)
CREAT SERPL-MCNC: 1 MG/DL (ref 0.55–1.02)
EOSINOPHIL # BLD AUTO: 0.1 10*3/UL (ref 0–0.4)
EOSINOPHIL # BLD AUTO: 1 % (ref 1–4)
ERYTHROCYTE [DISTWIDTH] IN BLOOD BY AUTOMATED COUNT: 18.6 % (ref 0–14.5)
HCT VFR BLD AUTO: 24.5 % (ref 37–47)
LYMPHOCYTES # BLD AUTO: 0.7 10*3/UL (ref 1.3–4.4)
LYMPHOCYTES NFR BLD AUTO: 11.4 % (ref 27–41)
MCH RBC QN AUTO: 23.7 PG (ref 27–31)
MCHC RBC AUTO-ENTMCNC: 29.4 G/DL (ref 33–37)
MCV RBC AUTO: 80.6 FL (ref 81–99)
MONOCYTES # BLD AUTO: 0.2 10*3/UL (ref 0.1–1)
MONOCYTES NFR BLD MANUAL: 3.8 % (ref 3–9)
NEUT #: 5.1 10*3/UL (ref 2.3–7.9)
NEUT %: 83.3 % (ref 47–73)
NRBC BLD QL AUTO: 0 % (ref 0–0)
PLATELET # BLD AUTO: 113 10*3/UL (ref 130–400)
PMV BLD AUTO: 11.4 FL (ref 9.6–12.3)
POTASSIUM SERPL-SCNC: 2.8 MMOL/L (ref 3.5–5.1)
RBC # BLD AUTO: 3.04 10*6/UL (ref 4.1–5.1)
SODIUM SERPL-SCNC: 144 MMOL/L (ref 136–145)
WBC NRBC COR # BLD AUTO: 6.1 10*3/UL (ref 4.8–10.8)

## 2020-06-25 NOTE — NUR
PHYSICAL THERAPY CO-SIGN
 
 
I approve of the Physical Therapy notes written above.
 
                                RACHEAL ROBLES, PT, DPT

## 2020-06-25 NOTE — NUR
OT NOTE
Pt seen this date for 25 min 1:1 therapy session. Upon arrival pt supine in
bed, bed alarm activated and receiving 3LO2 via NC. When asked to rate pain pt
stated she had "horrible pain" in her legs and back but did not rate on a 0-10
pain scale. Noted pts R shoulder sling properly donned upon arrival. Pt was
assisted from supine to seated at EOB requiring Max A x3. Throughout entire
session pt complained of pain and repeated "help me" and "I can't do it". Pt
sat at EOB for 12 min w SBA and required Max verbal cues for encouragement w
slight R lateral lean requiring occasional Min A to correct. Noted pt able
to raise LUE to face level this date. Pt then returned to Supine in bed
requiring Max A x3. Noted edema in R arm which was elevated on pillow for
edema control at end of session. At end of session pt supine in bed w 3LO2 via
NC, bed alarm activated and call light within reach. Continue w current D/C
plan to SNF.
 
EVETTE Ervin/ALMITA Samson/NADIA

## 2020-06-25 NOTE — NUR
OCCUPATIONAL THERAPY CO-SIGN
 
I approve of the Occupational Therapy notes written above.
Pilar Rodríguez OTR/L

## 2020-06-25 NOTE — NUR
DILAUDID ADMINISTERED FOR PT C/O SHOULDER AND BACK PAIN RATED A 10/10 ON THE
PAIN SCALE. WILL CONTINUE TO MONITOR AND REASSESS.

## 2020-06-25 NOTE — NUR
PHYSICAL THERAPY
 
Patient seen this am 1;1 for therapy visit and was supine in bed upon
therapist arrival. Patient identified by name /  and presented with
continuos O2-3L via NC. OT assistant was also present for observation this
session as patient transfers supine to sit EOB with MAX A x 3. Patient
repeatedly voiced "oh my back hurts" or "I can't sit up" even though she was
tolerating  EOB sit x 12 minutes, SBA.  Patient returned to supine in bed, MAX
A x 2 and remained with call light, bed side rails raised, bed alarm for
safety. Will continue per POC as tolerated, total treatment time 17 minutes.
Gen Sharma, PTA

## 2020-06-26 VITALS — DIASTOLIC BLOOD PRESSURE: 40 MMHG

## 2020-06-26 VITALS — DIASTOLIC BLOOD PRESSURE: 52 MMHG

## 2020-06-26 NOTE — NUR
[facility] WAS NOTIFIED OF PATIENT DISCHARGE. W SPOKE WITH [facility] AND ARRANGED
FOR A 3PM TRANSPORT. W NOTIFIED JOSÉ ANTONIO LERNER, PATIENTS GRANDDAUGHTER ROLY,
AND SIOBHAN OF PATIENT DISCHARGE. WILL FAX DISCHARGE ORDERS TO [facility].

## 2020-06-26 NOTE — NUR
Discharge instructions reviewed with patient/family. Patient receptive and
verbalizes understanding. Follow-up care arranged. Written instructions given
to patient/family.
SHRUTHI MASTERSON

## 2020-06-29 ENCOUNTER — HOSPITAL ENCOUNTER (INPATIENT)
Dept: HOSPITAL 83 - ED | Age: 75
LOS: 8 days | Discharge: OTHER INSTITUTION | DRG: 871 | End: 2020-07-07
Attending: INTERNAL MEDICINE | Admitting: INTERNAL MEDICINE
Payer: MEDICARE

## 2020-06-29 VITALS — DIASTOLIC BLOOD PRESSURE: 48 MMHG | SYSTOLIC BLOOD PRESSURE: 109 MMHG

## 2020-06-29 VITALS — HEIGHT: 63.98 IN | BODY MASS INDEX: 50.02 KG/M2 | WEIGHT: 293 LBS

## 2020-06-29 VITALS — DIASTOLIC BLOOD PRESSURE: 49 MMHG | SYSTOLIC BLOOD PRESSURE: 107 MMHG

## 2020-06-29 VITALS — DIASTOLIC BLOOD PRESSURE: 45 MMHG

## 2020-06-29 VITALS — SYSTOLIC BLOOD PRESSURE: 119 MMHG | DIASTOLIC BLOOD PRESSURE: 50 MMHG

## 2020-06-29 VITALS — SYSTOLIC BLOOD PRESSURE: 103 MMHG | DIASTOLIC BLOOD PRESSURE: 76 MMHG

## 2020-06-29 VITALS — DIASTOLIC BLOOD PRESSURE: 43 MMHG

## 2020-06-29 VITALS — DIASTOLIC BLOOD PRESSURE: 44 MMHG

## 2020-06-29 VITALS — DIASTOLIC BLOOD PRESSURE: 76 MMHG

## 2020-06-29 VITALS — DIASTOLIC BLOOD PRESSURE: 51 MMHG

## 2020-06-29 VITALS — DIASTOLIC BLOOD PRESSURE: 78 MMHG

## 2020-06-29 VITALS — DIASTOLIC BLOOD PRESSURE: 41 MMHG

## 2020-06-29 VITALS — DIASTOLIC BLOOD PRESSURE: 91 MMHG

## 2020-06-29 DIAGNOSIS — Z90.710: ICD-10-CM

## 2020-06-29 DIAGNOSIS — N18.3: ICD-10-CM

## 2020-06-29 DIAGNOSIS — Z82.3: ICD-10-CM
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
## 2020-06-29 DIAGNOSIS — E83.42: ICD-10-CM

## 2020-06-29 DIAGNOSIS — Z91.041: ICD-10-CM

## 2020-06-29 DIAGNOSIS — K57.90: ICD-10-CM

## 2020-06-29 DIAGNOSIS — M19.90: ICD-10-CM

## 2020-06-29 DIAGNOSIS — E43: ICD-10-CM

## 2020-06-29 DIAGNOSIS — B96.89: ICD-10-CM

## 2020-06-29 DIAGNOSIS — E83.51: ICD-10-CM

## 2020-06-29 DIAGNOSIS — B35.1: ICD-10-CM

## 2020-06-29 DIAGNOSIS — Z16.29: ICD-10-CM

## 2020-06-29 DIAGNOSIS — Z20.828: ICD-10-CM

## 2020-06-29 DIAGNOSIS — E11.22: ICD-10-CM

## 2020-06-29 DIAGNOSIS — N17.0: ICD-10-CM

## 2020-06-29 DIAGNOSIS — Z91.013: ICD-10-CM

## 2020-06-29 DIAGNOSIS — Z79.01: ICD-10-CM

## 2020-06-29 DIAGNOSIS — N30.01: ICD-10-CM

## 2020-06-29 DIAGNOSIS — Z79.4: ICD-10-CM

## 2020-06-29 DIAGNOSIS — R65.20: ICD-10-CM

## 2020-06-29 DIAGNOSIS — K21.9: ICD-10-CM

## 2020-06-29 DIAGNOSIS — I50.33: ICD-10-CM

## 2020-06-29 DIAGNOSIS — E66.01: ICD-10-CM

## 2020-06-29 DIAGNOSIS — Z79.899: ICD-10-CM

## 2020-06-29 DIAGNOSIS — G47.33: ICD-10-CM

## 2020-06-29 DIAGNOSIS — E83.39: ICD-10-CM

## 2020-06-29 DIAGNOSIS — E11.65: ICD-10-CM
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
## 2020-06-29 DIAGNOSIS — G89.4: ICD-10-CM

## 2020-06-29 DIAGNOSIS — I13.0: ICD-10-CM

## 2020-06-29 DIAGNOSIS — E87.6: ICD-10-CM

## 2020-06-29 DIAGNOSIS — Z86.14: ICD-10-CM
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
## 2020-06-29 DIAGNOSIS — I70.201: ICD-10-CM

## 2020-06-29 DIAGNOSIS — Z90.49: ICD-10-CM

## 2020-06-29 DIAGNOSIS — Z82.49: ICD-10-CM

## 2020-06-29 DIAGNOSIS — E87.1: ICD-10-CM

## 2020-06-29 DIAGNOSIS — A41.01: Primary | ICD-10-CM

## 2020-06-29 DIAGNOSIS — Z96.651: ICD-10-CM

## 2020-06-29 DIAGNOSIS — Z79.82: ICD-10-CM

## 2020-06-29 DIAGNOSIS — E11.40: ICD-10-CM

## 2020-06-29 DIAGNOSIS — Z87.440: ICD-10-CM

## 2020-06-29 DIAGNOSIS — J96.11: ICD-10-CM

## 2020-06-29 DIAGNOSIS — K74.60: ICD-10-CM
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
## 2020-06-29 DIAGNOSIS — I38: ICD-10-CM

## 2020-06-29 DIAGNOSIS — S80.821A: ICD-10-CM

## 2020-06-29 DIAGNOSIS — E03.9: ICD-10-CM

## 2020-06-29 DIAGNOSIS — R79.82: ICD-10-CM

## 2020-06-29 DIAGNOSIS — Z83.3: ICD-10-CM

## 2020-06-29 DIAGNOSIS — D61.818: ICD-10-CM

## 2020-06-29 DIAGNOSIS — E78.5: ICD-10-CM

## 2020-06-29 DIAGNOSIS — Z86.718: ICD-10-CM

## 2020-06-29 DIAGNOSIS — Z91.018: ICD-10-CM

## 2020-06-29 DIAGNOSIS — J44.9: ICD-10-CM

## 2020-06-29 DIAGNOSIS — S80.822A: ICD-10-CM

## 2020-06-29 DIAGNOSIS — I48.0: ICD-10-CM

## 2020-06-29 DIAGNOSIS — R74.8: ICD-10-CM

## 2020-06-29 LAB
ALBUMIN SERPL-MCNC: 1.2 GM/DL (ref 3.1–4.5)
ALP SERPL-CCNC: 66 U/L (ref 45–117)
ALT SERPL W P-5'-P-CCNC: < 6 U/L (ref 12–78)
APPEARANCE UR: (no result)
APTT PPP: 35.2 SECONDS (ref 20–32.1)
AST SERPL-CCNC: 14 IU/L (ref 3–35)
BACTERIA #/AREA URNS HPF: (no result) /[HPF]
BASOPHILS # BLD AUTO: 0 10*3/UL (ref 0–0.1)
BASOPHILS NFR BLD AUTO: 0.5 % (ref 0–1)
BILIRUB UR QL STRIP: NEGATIVE
BUN SERPL-MCNC: 20 MG/DL (ref 7–24)
BUN SERPL-MCNC: 21 MG/DL (ref 7–24)
CHLORIDE SERPL-SCNC: 100 MMOL/L (ref 98–107)
CHLORIDE SERPL-SCNC: 98 MMOL/L (ref 98–107)
COLOR UR: YELLOW
CREAT SERPL-MCNC: 1.35 MG/DL (ref 0.55–1.02)
CREAT SERPL-MCNC: 1.5 MG/DL (ref 0.55–1.02)
EOSINOPHIL # BLD AUTO: 0.1 10*3/UL (ref 0–0.4)
EOSINOPHIL # BLD AUTO: 1.1 % (ref 1–4)
ERYTHROCYTE [DISTWIDTH] IN BLOOD BY AUTOMATED COUNT: 19.8 % (ref 0–14.5)
ERYTHROCYTE [DISTWIDTH] IN BLOOD BY AUTOMATED COUNT: 19.9 % (ref 0–14.5)
GLUCOSE UR QL: NEGATIVE
HCT VFR BLD AUTO: 22 % (ref 37–47)
HCT VFR BLD AUTO: 27 % (ref 37–47)
HGB UR QL STRIP: (no result)
INR BLD: 1.3 (ref 2–3.5)
KETONES UR QL STRIP: NEGATIVE
LEUKOCYTE ESTERASE UR QL STRIP: (no result)
LIPASE SERPL-CCNC: 855 U/L (ref 73–393)
LYMPHOCYTES # BLD AUTO: 0.8 10*3/UL (ref 1.3–4.4)
LYMPHOCYTES NFR BLD AUTO: 10.4 % (ref 27–41)
MCH RBC QN AUTO: 24.1 PG (ref 27–31)
MCH RBC QN AUTO: 25.4 PG (ref 27–31)
MCHC RBC AUTO-ENTMCNC: 31.4 G/DL (ref 33–37)
MCHC RBC AUTO-ENTMCNC: 31.9 G/DL (ref 33–37)
MCV RBC AUTO: 76.9 FL (ref 81–99)
MCV RBC AUTO: 79.6 FL (ref 81–99)
MICROCYTES BLD QL SMEAR: SLIGHT
MONOCYTES # BLD AUTO: 0.4 10*3/UL (ref 0.1–1)
MONOCYTES NFR BLD MANUAL: 4.6 % (ref 3–9)
NEUT #: 6.6 10*3/UL (ref 2.3–7.9)
NEUT %: 82.9 % (ref 47–73)
NITRITE UR QL STRIP: NEGATIVE
NRBC BLD QL AUTO: 0 10*3/UL (ref 0–0)
NRBC BLD QL AUTO: 0 10*3/UL (ref 0–0)
PH UR STRIP: 6 [PH] (ref 5–9)
PLATELET # BLD AUTO: 111 10*3/UL (ref 130–400)
PLATELET # BLD AUTO: 92 10*3/UL (ref 130–400)
PLATELET SUFFICIENCY: (no result)
PMV BLD AUTO: 10 FL (ref 9.6–12.3)
PMV BLD AUTO: 10.3 FL (ref 9.6–12.3)
POTASSIUM SERPL-SCNC: 2.4 MMOL/L (ref 3.5–5.1)
POTASSIUM SERPL-SCNC: 2.6 MMOL/L (ref 3.5–5.1)
PROT SERPL-MCNC: 5.7 GM/DL (ref 6.4–8.2)
RBC # BLD AUTO: 2.86 10*6/UL (ref 4.1–5.1)
RBC # BLD AUTO: 3.39 10*6/UL (ref 4.1–5.1)
RBC #/AREA URNS HPF: (no result) RBC/HPF (ref 0–2)
SODIUM SERPL-SCNC: 133 MMOL/L (ref 136–145)
SODIUM SERPL-SCNC: 134 MMOL/L (ref 136–145)
SP GR UR: 1.02 (ref 1–1.03)
TOTAL CELLS COUNTED: 100 #CELLS
TROPONIN I SERPL-MCNC: 0.34 NG/ML (ref ?–0.04)
UROBILINOGEN UR STRIP-MCNC: 0.2 E.U./DL (ref 0.2–1)
WBC #/AREA URNS HPF: (no result) WBC/HPF (ref 0–5)
WBC NRBC COR # BLD AUTO: 4.9 10*3/UL (ref 4.8–10.8)
WBC NRBC COR # BLD AUTO: 7.9 10*3/UL (ref 4.8–10.8)
YEAST #/AREA URNS HPF: (no result) /[HPF]

## 2020-06-30 VITALS — DIASTOLIC BLOOD PRESSURE: 66 MMHG

## 2020-06-30 VITALS — SYSTOLIC BLOOD PRESSURE: 118 MMHG | DIASTOLIC BLOOD PRESSURE: 46 MMHG

## 2020-06-30 VITALS — DIASTOLIC BLOOD PRESSURE: 40 MMHG

## 2020-06-30 VITALS — DIASTOLIC BLOOD PRESSURE: 57 MMHG

## 2020-06-30 VITALS — DIASTOLIC BLOOD PRESSURE: 48 MMHG

## 2020-06-30 LAB
APPEARANCE UR: (no result)
BILIRUB UR QL STRIP: NEGATIVE
BUN SERPL-MCNC: 23 MG/DL (ref 7–24)
CHLORIDE SERPL-SCNC: 101 MMOL/L (ref 98–107)
COLOR UR: YELLOW
CREAT SERPL-MCNC: 1.23 MG/DL (ref 0.55–1.02)
ERYTHROCYTE [DISTWIDTH] IN BLOOD BY AUTOMATED COUNT: 19.9 % (ref 0–14.5)
GLUCOSE UR QL: NEGATIVE
HCT VFR BLD AUTO: 26.2 % (ref 37–47)
HGB UR QL STRIP: (no result)
KETONES UR QL STRIP: NEGATIVE
LEUKOCYTE ESTERASE UR QL STRIP: (no result)
MCH RBC QN AUTO: 25.2 PG (ref 27–31)
MCHC RBC AUTO-ENTMCNC: 31.3 G/DL (ref 33–37)
MCV RBC AUTO: 80.4 FL (ref 81–99)
NITRITE UR QL STRIP: NEGATIVE
NRBC BLD QL AUTO: 0 % (ref 0–0)
PH UR STRIP: 6 [PH] (ref 5–9)
PLATELET # BLD AUTO: 97 10*3/UL (ref 130–400)
PLATELET SUFFICIENCY: (no result)
PMV BLD AUTO: 11.8 FL (ref 9.6–12.3)
POTASSIUM SERPL-SCNC: 2.9 MMOL/L (ref 3.5–5.1)
RBC # BLD AUTO: 3.26 10*6/UL (ref 4.1–5.1)
RBC #/AREA URNS HPF: (no result) RBC/HPF (ref 0–2)
RBC MORPH BLD: NORMAL
SODIUM SERPL-SCNC: 137 MMOL/L (ref 136–145)
SP GR UR: 1.02 (ref 1–1.03)
TOTAL CELLS COUNTED: 100 #CELLS
UROBILINOGEN UR STRIP-MCNC: 0.2 E.U./DL (ref 0.2–1)
WBC #/AREA URNS HPF: (no result) WBC/HPF (ref 0–5)
WBC NRBC COR # BLD AUTO: 3.9 10*3/UL (ref 4.8–10.8)
YEAST #/AREA URNS HPF: (no result) /[HPF]

## 2020-07-01 VITALS — DIASTOLIC BLOOD PRESSURE: 39 MMHG | SYSTOLIC BLOOD PRESSURE: 120 MMHG

## 2020-07-01 VITALS — DIASTOLIC BLOOD PRESSURE: 40 MMHG

## 2020-07-01 VITALS — SYSTOLIC BLOOD PRESSURE: 135 MMHG | DIASTOLIC BLOOD PRESSURE: 50 MMHG

## 2020-07-01 VITALS — DIASTOLIC BLOOD PRESSURE: 42 MMHG

## 2020-07-01 VITALS — DIASTOLIC BLOOD PRESSURE: 60 MMHG

## 2020-07-01 LAB
BASOPHILS # BLD AUTO: 0 10*3/UL (ref 0–0.1)
BASOPHILS NFR BLD AUTO: 0.5 % (ref 0–1)
BUN SERPL-MCNC: 22 MG/DL (ref 7–24)
CHLORIDE SERPL-SCNC: 106 MMOL/L (ref 98–107)
CREAT SERPL-MCNC: 1.03 MG/DL (ref 0.55–1.02)
EOSINOPHIL # BLD AUTO: 0.1 10*3/UL (ref 0–0.4)
EOSINOPHIL # BLD AUTO: 3 % (ref 1–4)
ERYTHROCYTE [DISTWIDTH] IN BLOOD BY AUTOMATED COUNT: 20.6 % (ref 0–14.5)
HCT VFR BLD AUTO: 23.1 % (ref 37–47)
LYMPHOCYTES # BLD AUTO: 0.7 10*3/UL (ref 1.3–4.4)
LYMPHOCYTES NFR BLD AUTO: 16.9 % (ref 27–41)
MCH RBC QN AUTO: 24.4 PG (ref 27–31)
MCHC RBC AUTO-ENTMCNC: 30.3 G/DL (ref 33–37)
MCV RBC AUTO: 80.5 FL (ref 81–99)
MONOCYTES # BLD AUTO: 0.3 10*3/UL (ref 0.1–1)
MONOCYTES NFR BLD MANUAL: 5.8 % (ref 3–9)
NEUT #: 3.2 10*3/UL (ref 2.3–7.9)
NEUT %: 73.3 % (ref 47–73)
NRBC BLD QL AUTO: 0 % (ref 0–0)
PLATELET # BLD AUTO: 95 10*3/UL (ref 130–400)
PMV BLD AUTO: 11 FL (ref 9.6–12.3)
POTASSIUM SERPL-SCNC: 2.9 MMOL/L (ref 3.5–5.1)
RBC # BLD AUTO: 2.87 10*6/UL (ref 4.1–5.1)
SODIUM SERPL-SCNC: 137 MMOL/L (ref 136–145)
WBC NRBC COR # BLD AUTO: 4.3 10*3/UL (ref 4.8–10.8)

## 2020-07-02 VITALS — DIASTOLIC BLOOD PRESSURE: 71 MMHG

## 2020-07-02 VITALS — DIASTOLIC BLOOD PRESSURE: 64 MMHG

## 2020-07-02 VITALS — DIASTOLIC BLOOD PRESSURE: 43 MMHG

## 2020-07-02 VITALS — DIASTOLIC BLOOD PRESSURE: 53 MMHG

## 2020-07-02 VITALS — DIASTOLIC BLOOD PRESSURE: 67 MMHG

## 2020-07-02 LAB
BASOPHILS # BLD AUTO: 0 10*3/UL (ref 0–0.1)
BASOPHILS NFR BLD AUTO: 0.7 % (ref 0–1)
BUN SERPL-MCNC: 20 MG/DL (ref 7–24)
CHLORIDE SERPL-SCNC: 102 MMOL/L (ref 98–107)
CREAT SERPL-MCNC: 1.03 MG/DL (ref 0.55–1.02)
EOSINOPHIL # BLD AUTO: 0.2 10*3/UL (ref 0–0.4)
EOSINOPHIL # BLD AUTO: 3.8 % (ref 1–4)
ERYTHROCYTE [DISTWIDTH] IN BLOOD BY AUTOMATED COUNT: 21.3 % (ref 0–14.5)
HCT VFR BLD AUTO: 26.2 % (ref 37–47)
LYMPHOCYTES # BLD AUTO: 1.3 10*3/UL (ref 1.3–4.4)
LYMPHOCYTES NFR BLD AUTO: 22.4 % (ref 27–41)
MCH RBC QN AUTO: 24.8 PG (ref 27–31)
MCHC RBC AUTO-ENTMCNC: 30.9 G/DL (ref 33–37)
MCV RBC AUTO: 80.1 FL (ref 81–99)
MONOCYTES # BLD AUTO: 0.5 10*3/UL (ref 0.1–1)
MONOCYTES NFR BLD MANUAL: 8.3 % (ref 3–9)
NEUT #: 3.7 10*3/UL (ref 2.3–7.9)
NEUT %: 64.3 % (ref 47–73)
NRBC BLD QL AUTO: 0 % (ref 0–0)
PLATELET # BLD AUTO: 111 10*3/UL (ref 130–400)
PMV BLD AUTO: 10.3 FL (ref 9.6–12.3)
POTASSIUM SERPL-SCNC: 3.7 MMOL/L (ref 3.5–5.1)
POTASSIUM SERPL-SCNC: 4 MMOL/L (ref 3.5–5.1)
RBC # BLD AUTO: 3.27 10*6/UL (ref 4.1–5.1)
SODIUM SERPL-SCNC: 135 MMOL/L (ref 136–145)
WBC NRBC COR # BLD AUTO: 5.8 10*3/UL (ref 4.8–10.8)

## 2020-07-03 VITALS — DIASTOLIC BLOOD PRESSURE: 67 MMHG

## 2020-07-03 VITALS — DIASTOLIC BLOOD PRESSURE: 67 MMHG | SYSTOLIC BLOOD PRESSURE: 148 MMHG

## 2020-07-03 VITALS — DIASTOLIC BLOOD PRESSURE: 59 MMHG

## 2020-07-03 VITALS — DIASTOLIC BLOOD PRESSURE: 40 MMHG

## 2020-07-03 LAB
BASOPHILS # BLD AUTO: 0 10*3/UL (ref 0–0.1)
BASOPHILS NFR BLD AUTO: 0.5 % (ref 0–1)
BUN SERPL-MCNC: 19 MG/DL (ref 7–24)
BUN SERPL-MCNC: 21 MG/DL (ref 7–24)
CHLORIDE SERPL-SCNC: 101 MMOL/L (ref 98–107)
CHLORIDE SERPL-SCNC: 102 MMOL/L (ref 98–107)
CREAT SERPL-MCNC: 0.94 MG/DL (ref 0.55–1.02)
CREAT SERPL-MCNC: 1.09 MG/DL (ref 0.55–1.02)
EOSINOPHIL # BLD AUTO: 0.1 10*3/UL (ref 0–0.4)
EOSINOPHIL # BLD AUTO: 2.6 % (ref 1–4)
ERYTHROCYTE [DISTWIDTH] IN BLOOD BY AUTOMATED COUNT: 21.5 % (ref 0–14.5)
HCT VFR BLD AUTO: 23.6 % (ref 37–47)
LYMPHOCYTES # BLD AUTO: 0.9 10*3/UL (ref 1.3–4.4)
LYMPHOCYTES NFR BLD AUTO: 24.3 % (ref 27–41)
MCH RBC QN AUTO: 24.7 PG (ref 27–31)
MCHC RBC AUTO-ENTMCNC: 30.5 G/DL (ref 33–37)
MCV RBC AUTO: 81.1 FL (ref 81–99)
MONOCYTES # BLD AUTO: 0.3 10*3/UL (ref 0.1–1)
MONOCYTES NFR BLD MANUAL: 7.8 % (ref 3–9)
NEUT #: 2.5 10*3/UL (ref 2.3–7.9)
NEUT %: 64.3 % (ref 47–73)
NRBC BLD QL AUTO: 0 % (ref 0–0)
PLATELET # BLD AUTO: 81 10*3/UL (ref 130–400)
PMV BLD AUTO: 10.8 FL (ref 9.6–12.3)
POTASSIUM SERPL-SCNC: 3.9 MMOL/L (ref 3.5–5.1)
POTASSIUM SERPL-SCNC: 4.5 MMOL/L (ref 3.5–5.1)
RBC # BLD AUTO: 2.91 10*6/UL (ref 4.1–5.1)
SODIUM SERPL-SCNC: 134 MMOL/L (ref 136–145)
SODIUM SERPL-SCNC: 136 MMOL/L (ref 136–145)
WBC NRBC COR # BLD AUTO: 3.8 10*3/UL (ref 4.8–10.8)

## 2020-07-04 VITALS — DIASTOLIC BLOOD PRESSURE: 52 MMHG

## 2020-07-04 VITALS — DIASTOLIC BLOOD PRESSURE: 41 MMHG | SYSTOLIC BLOOD PRESSURE: 126 MMHG

## 2020-07-04 VITALS — DIASTOLIC BLOOD PRESSURE: 48 MMHG

## 2020-07-04 VITALS — DIASTOLIC BLOOD PRESSURE: 52 MMHG | SYSTOLIC BLOOD PRESSURE: 132 MMHG

## 2020-07-04 VITALS — DIASTOLIC BLOOD PRESSURE: 39 MMHG

## 2020-07-04 LAB
BASOPHILS # BLD AUTO: 0 10*3/UL (ref 0–0.1)
BASOPHILS NFR BLD AUTO: 0.7 % (ref 0–1)
BUN SERPL-MCNC: 20 MG/DL (ref 7–24)
CHLORIDE SERPL-SCNC: 100 MMOL/L (ref 98–107)
CREAT SERPL-MCNC: 1.06 MG/DL (ref 0.55–1.02)
EOSINOPHIL # BLD AUTO: 0.1 10*3/UL (ref 0–0.4)
EOSINOPHIL # BLD AUTO: 4.2 % (ref 1–4)
ERYTHROCYTE [DISTWIDTH] IN BLOOD BY AUTOMATED COUNT: 21.8 % (ref 0–14.5)
HCT VFR BLD AUTO: 22.8 % (ref 37–47)
LYMPHOCYTES # BLD AUTO: 0.6 10*3/UL (ref 1.3–4.4)
LYMPHOCYTES NFR BLD AUTO: 22.2 % (ref 27–41)
MCH RBC QN AUTO: 24.8 PG (ref 27–31)
MCHC RBC AUTO-ENTMCNC: 30.7 G/DL (ref 33–37)
MCV RBC AUTO: 80.9 FL (ref 81–99)
MONOCYTES # BLD AUTO: 0.2 10*3/UL (ref 0.1–1)
MONOCYTES NFR BLD MANUAL: 8.3 % (ref 3–9)
NEUT #: 1.9 10*3/UL (ref 2.3–7.9)
NEUT %: 64.3 % (ref 47–73)
NRBC BLD QL AUTO: 0 10*3/UL (ref 0–0)
PLATELET # BLD AUTO: 70 10*3/UL (ref 130–400)
PMV BLD AUTO: 10.4 FL (ref 9.6–12.3)
POTASSIUM SERPL-SCNC: 4.1 MMOL/L (ref 3.5–5.1)
RBC # BLD AUTO: 2.82 10*6/UL (ref 4.1–5.1)
SODIUM SERPL-SCNC: 135 MMOL/L (ref 136–145)
WBC NRBC COR # BLD AUTO: 2.9 10*3/UL (ref 4.8–10.8)

## 2020-07-05 VITALS — SYSTOLIC BLOOD PRESSURE: 135 MMHG | DIASTOLIC BLOOD PRESSURE: 56 MMHG

## 2020-07-05 VITALS — DIASTOLIC BLOOD PRESSURE: 54 MMHG | SYSTOLIC BLOOD PRESSURE: 132 MMHG

## 2020-07-05 VITALS — DIASTOLIC BLOOD PRESSURE: 52 MMHG | SYSTOLIC BLOOD PRESSURE: 132 MMHG

## 2020-07-05 VITALS — DIASTOLIC BLOOD PRESSURE: 58 MMHG

## 2020-07-05 VITALS — DIASTOLIC BLOOD PRESSURE: 56 MMHG

## 2020-07-05 LAB
ALBUMIN SERPL-MCNC: 1.3 GM/DL (ref 3.1–4.5)
ALP SERPL-CCNC: 49 U/L (ref 45–117)
ALT SERPL W P-5'-P-CCNC: 6 U/L (ref 12–78)
AST SERPL-CCNC: 17 IU/L (ref 3–35)
BASOPHILS # BLD AUTO: 0 10*3/UL (ref 0–0.1)
BASOPHILS NFR BLD AUTO: 0.4 % (ref 0–1)
BUN SERPL-MCNC: 17 MG/DL (ref 7–24)
CHLORIDE SERPL-SCNC: 99 MMOL/L (ref 98–107)
CREAT SERPL-MCNC: 1.12 MG/DL (ref 0.55–1.02)
EOSINOPHIL # BLD AUTO: 0.1 10*3/UL (ref 0–0.4)
EOSINOPHIL # BLD AUTO: 4.1 % (ref 1–4)
ERYTHROCYTE [DISTWIDTH] IN BLOOD BY AUTOMATED COUNT: 22.6 % (ref 0–14.5)
HCT VFR BLD AUTO: 23.5 % (ref 37–47)
LYMPHOCYTES # BLD AUTO: 0.6 10*3/UL (ref 1.3–4.4)
LYMPHOCYTES NFR BLD AUTO: 21.2 % (ref 27–41)
MCH RBC QN AUTO: 24.7 PG (ref 27–31)
MCHC RBC AUTO-ENTMCNC: 30.2 G/DL (ref 33–37)
MCV RBC AUTO: 81.6 FL (ref 81–99)
MONOCYTES # BLD AUTO: 0.3 10*3/UL (ref 0.1–1)
MONOCYTES NFR BLD MANUAL: 9.7 % (ref 3–9)
NEUT #: 1.7 10*3/UL (ref 2.3–7.9)
NEUT %: 63.9 % (ref 47–73)
NRBC BLD QL AUTO: 0 % (ref 0–0)
PLATELET # BLD AUTO: 85 10*3/UL (ref 130–400)
PMV BLD AUTO: 11.3 FL (ref 9.6–12.3)
POTASSIUM SERPL-SCNC: 3.6 MMOL/L (ref 3.5–5.1)
PROT SERPL-MCNC: 5.8 GM/DL (ref 6.4–8.2)
RBC # BLD AUTO: 2.88 10*6/UL (ref 4.1–5.1)
SODIUM SERPL-SCNC: 135 MMOL/L (ref 136–145)
WBC NRBC COR # BLD AUTO: 2.7 10*3/UL (ref 4.8–10.8)

## 2020-07-06 VITALS — SYSTOLIC BLOOD PRESSURE: 129 MMHG | DIASTOLIC BLOOD PRESSURE: 67 MMHG

## 2020-07-06 VITALS — SYSTOLIC BLOOD PRESSURE: 136 MMHG | DIASTOLIC BLOOD PRESSURE: 61 MMHG

## 2020-07-06 VITALS — DIASTOLIC BLOOD PRESSURE: 54 MMHG

## 2020-07-06 VITALS — DIASTOLIC BLOOD PRESSURE: 52 MMHG

## 2020-07-06 VITALS — DIASTOLIC BLOOD PRESSURE: 87 MMHG

## 2020-07-06 LAB
BASOPHILS # BLD AUTO: 0 10*3/UL (ref 0–0.1)
BASOPHILS NFR BLD AUTO: 0.6 % (ref 0–1)
BUN SERPL-MCNC: 14 MG/DL (ref 7–24)
CHLORIDE SERPL-SCNC: 100 MMOL/L (ref 98–107)
CREAT SERPL-MCNC: 0.99 MG/DL (ref 0.55–1.02)
EOSINOPHIL # BLD AUTO: 0.2 10*3/UL (ref 0–0.4)
EOSINOPHIL # BLD AUTO: 5.7 % (ref 1–4)
ERYTHROCYTE [DISTWIDTH] IN BLOOD BY AUTOMATED COUNT: 23.3 % (ref 0–14.5)
HCT VFR BLD AUTO: 25.9 % (ref 37–47)
LYMPHOCYTES # BLD AUTO: 0.8 10*3/UL (ref 1.3–4.4)
LYMPHOCYTES NFR BLD AUTO: 22.7 % (ref 27–41)
MCH RBC QN AUTO: 25 PG (ref 27–31)
MCHC RBC AUTO-ENTMCNC: 30.5 G/DL (ref 33–37)
MCV RBC AUTO: 82 FL (ref 81–99)
MONOCYTES # BLD AUTO: 0.3 10*3/UL (ref 0.1–1)
MONOCYTES NFR BLD MANUAL: 9.3 % (ref 3–9)
NEUT #: 2.1 10*3/UL (ref 2.3–7.9)
NEUT %: 61.1 % (ref 47–73)
NRBC BLD QL AUTO: 0 % (ref 0–0)
PLATELET # BLD AUTO: 108 10*3/UL (ref 130–400)
PMV BLD AUTO: 10.4 FL (ref 9.6–12.3)
POTASSIUM SERPL-SCNC: 3.4 MMOL/L (ref 3.5–5.1)
RBC # BLD AUTO: 3.16 10*6/UL (ref 4.1–5.1)
SODIUM SERPL-SCNC: 136 MMOL/L (ref 136–145)
WBC NRBC COR # BLD AUTO: 3.4 10*3/UL (ref 4.8–10.8)

## 2020-07-07 VITALS — DIASTOLIC BLOOD PRESSURE: 43 MMHG

## 2020-07-07 VITALS — SYSTOLIC BLOOD PRESSURE: 115 MMHG | DIASTOLIC BLOOD PRESSURE: 50 MMHG

## 2020-07-07 VITALS — SYSTOLIC BLOOD PRESSURE: 124 MMHG | DIASTOLIC BLOOD PRESSURE: 54 MMHG

## 2020-07-07 LAB
ALBUMIN SERPL-MCNC: 1.4 GM/DL (ref 3.1–4.5)
ALP SERPL-CCNC: 52 U/L (ref 45–117)
ALT SERPL W P-5'-P-CCNC: 6 U/L (ref 12–78)
AST SERPL-CCNC: 20 IU/L (ref 3–35)
BASOPHILS # BLD AUTO: 0 10*3/UL (ref 0–0.1)
BASOPHILS NFR BLD AUTO: 0.6 % (ref 0–1)
BUN SERPL-MCNC: 12 MG/DL (ref 7–24)
CHLORIDE SERPL-SCNC: 99 MMOL/L (ref 98–107)
CREAT SERPL-MCNC: 0.99 MG/DL (ref 0.55–1.02)
EOSINOPHIL # BLD AUTO: 0.2 10*3/UL (ref 0–0.4)
EOSINOPHIL # BLD AUTO: 5.6 % (ref 1–4)
ERYTHROCYTE [DISTWIDTH] IN BLOOD BY AUTOMATED COUNT: 23.5 % (ref 0–14.5)
HCT VFR BLD AUTO: 24.5 % (ref 37–47)
LYMPHOCYTES # BLD AUTO: 0.8 10*3/UL (ref 1.3–4.4)
LYMPHOCYTES NFR BLD AUTO: 22.6 % (ref 27–41)
MCH RBC QN AUTO: 24.9 PG (ref 27–31)
MCHC RBC AUTO-ENTMCNC: 30.6 G/DL (ref 33–37)
MCV RBC AUTO: 81.4 FL (ref 81–99)
MONOCYTES # BLD AUTO: 0.3 10*3/UL (ref 0.1–1)
MONOCYTES NFR BLD MANUAL: 8.8 % (ref 3–9)
NEUT #: 2.2 10*3/UL (ref 2.3–7.9)
NEUT %: 61.8 % (ref 47–73)
NRBC BLD QL AUTO: 0 10*3/UL (ref 0–0)
PLATELET # BLD AUTO: 119 10*3/UL (ref 130–400)
PMV BLD AUTO: 10.1 FL (ref 9.6–12.3)
POTASSIUM SERPL-SCNC: 3.7 MMOL/L (ref 3.5–5.1)
PROT SERPL-MCNC: 6.2 GM/DL (ref 6.4–8.2)
RBC # BLD AUTO: 3.01 10*6/UL (ref 4.1–5.1)
SODIUM SERPL-SCNC: 136 MMOL/L (ref 136–145)
WBC NRBC COR # BLD AUTO: 3.5 10*3/UL (ref 4.8–10.8)

## 2020-07-13 ENCOUNTER — HOSPITAL ENCOUNTER (INPATIENT)
Dept: HOSPITAL 83 - ED | Age: 75
LOS: 3 days | Discharge: OTHER INSTITUTION | DRG: 871 | End: 2020-07-16
Attending: INTERNAL MEDICINE | Admitting: INTERNAL MEDICINE
Payer: MEDICARE

## 2020-07-13 VITALS — BODY MASS INDEX: 50.02 KG/M2 | HEIGHT: 63.98 IN | WEIGHT: 293 LBS

## 2020-07-13 VITALS — SYSTOLIC BLOOD PRESSURE: 144 MMHG | DIASTOLIC BLOOD PRESSURE: 66 MMHG

## 2020-07-13 DIAGNOSIS — E11.65: ICD-10-CM

## 2020-07-13 DIAGNOSIS — Z82.49: ICD-10-CM

## 2020-07-13 DIAGNOSIS — E11.51: ICD-10-CM

## 2020-07-13 DIAGNOSIS — E43: ICD-10-CM

## 2020-07-13 DIAGNOSIS — Z90.49: ICD-10-CM

## 2020-07-13 DIAGNOSIS — Z79.01: ICD-10-CM

## 2020-07-13 DIAGNOSIS — K21.9: ICD-10-CM

## 2020-07-13 DIAGNOSIS — Z79.899: ICD-10-CM

## 2020-07-13 DIAGNOSIS — D50.9: ICD-10-CM

## 2020-07-13 DIAGNOSIS — Z91.041: ICD-10-CM

## 2020-07-13 DIAGNOSIS — E66.01: ICD-10-CM

## 2020-07-13 DIAGNOSIS — M19.90: ICD-10-CM

## 2020-07-13 DIAGNOSIS — Z90.710: ICD-10-CM

## 2020-07-13 DIAGNOSIS — G89.4: ICD-10-CM

## 2020-07-13 DIAGNOSIS — I33.0: ICD-10-CM

## 2020-07-13 DIAGNOSIS — A41.01: Primary | ICD-10-CM

## 2020-07-13 DIAGNOSIS — I34.0: ICD-10-CM

## 2020-07-13 DIAGNOSIS — E55.9: ICD-10-CM

## 2020-07-13 DIAGNOSIS — E03.9: ICD-10-CM

## 2020-07-13 DIAGNOSIS — L89.320: ICD-10-CM

## 2020-07-13 DIAGNOSIS — R65.20: ICD-10-CM

## 2020-07-13 DIAGNOSIS — Z88.8: ICD-10-CM

## 2020-07-13 DIAGNOSIS — D69.6: ICD-10-CM

## 2020-07-13 DIAGNOSIS — J44.9: ICD-10-CM

## 2020-07-13 DIAGNOSIS — E87.6: ICD-10-CM

## 2020-07-13 DIAGNOSIS — G47.33: ICD-10-CM

## 2020-07-13 DIAGNOSIS — Z96.651: ICD-10-CM

## 2020-07-13 DIAGNOSIS — R07.89: ICD-10-CM

## 2020-07-13 DIAGNOSIS — I50.33: ICD-10-CM

## 2020-07-13 DIAGNOSIS — L89.890: ICD-10-CM

## 2020-07-13 DIAGNOSIS — Z20.828: ICD-10-CM

## 2020-07-13 DIAGNOSIS — N18.3: ICD-10-CM

## 2020-07-13 DIAGNOSIS — I48.0: ICD-10-CM

## 2020-07-13 DIAGNOSIS — E87.3: ICD-10-CM

## 2020-07-13 DIAGNOSIS — Z79.4: ICD-10-CM

## 2020-07-13 DIAGNOSIS — J96.10: ICD-10-CM

## 2020-07-13 DIAGNOSIS — Z91.013: ICD-10-CM

## 2020-07-13 DIAGNOSIS — E11.22: ICD-10-CM

## 2020-07-13 DIAGNOSIS — I24.8: ICD-10-CM

## 2020-07-13 DIAGNOSIS — I13.0: ICD-10-CM

## 2020-07-13 LAB
ALBUMIN SERPL-MCNC: 1.2 GM/DL (ref 3.1–4.5)
ALP SERPL-CCNC: 65 U/L (ref 45–117)
ALT SERPL W P-5'-P-CCNC: 6 U/L (ref 12–78)
APTT PPP: 32.8 SECONDS (ref 20–32.1)
AST SERPL-CCNC: 15 IU/L (ref 3–35)
BASOPHILS # BLD AUTO: 0 10*3/UL (ref 0–0.1)
BASOPHILS NFR BLD AUTO: 0.6 % (ref 0–1)
BUN SERPL-MCNC: 19 MG/DL (ref 7–24)
CHLORIDE SERPL-SCNC: 104 MMOL/L (ref 98–107)
CREAT SERPL-MCNC: 1.09 MG/DL (ref 0.55–1.02)
EOSINOPHIL # BLD AUTO: 0.1 10*3/UL (ref 0–0.4)
EOSINOPHIL # BLD AUTO: 2.4 % (ref 1–4)
ERYTHROCYTE [DISTWIDTH] IN BLOOD BY AUTOMATED COUNT: 26 % (ref 0–14.5)
HCT VFR BLD AUTO: 24.9 % (ref 37–47)
INR BLD: 1.2 (ref 2–3.5)
LYMPHOCYTES # BLD AUTO: 1 10*3/UL (ref 1.3–4.4)
LYMPHOCYTES NFR BLD AUTO: 20.9 % (ref 27–41)
MCH RBC QN AUTO: 25.7 PG (ref 27–31)
MCHC RBC AUTO-ENTMCNC: 30.5 G/DL (ref 33–37)
MCV RBC AUTO: 84.1 FL (ref 81–99)
MONOCYTES # BLD AUTO: 0.4 10*3/UL (ref 0.1–1)
MONOCYTES NFR BLD MANUAL: 7.8 % (ref 3–9)
NEUT #: 3.4 10*3/UL (ref 2.3–7.9)
NEUT %: 67.5 % (ref 47–73)
NRBC BLD QL AUTO: 0 % (ref 0–0)
PLATELET # BLD AUTO: 135 10*3/UL (ref 130–400)
PMV BLD AUTO: 8.6 FL (ref 9.6–12.3)
POTASSIUM SERPL-SCNC: 3.8 MMOL/L (ref 3.5–5.1)
PROT SERPL-MCNC: 6.1 GM/DL (ref 6.4–8.2)
RBC # BLD AUTO: 2.96 10*6/UL (ref 4.1–5.1)
SODIUM SERPL-SCNC: 139 MMOL/L (ref 136–145)
TROPONIN I SERPL-MCNC: 0.03 NG/ML (ref ?–0.04)
WBC NRBC COR # BLD AUTO: 5 10*3/UL (ref 4.8–10.8)

## 2020-07-14 VITALS — DIASTOLIC BLOOD PRESSURE: 47 MMHG | SYSTOLIC BLOOD PRESSURE: 107 MMHG

## 2020-07-14 VITALS — SYSTOLIC BLOOD PRESSURE: 112 MMHG | DIASTOLIC BLOOD PRESSURE: 59 MMHG

## 2020-07-14 VITALS — DIASTOLIC BLOOD PRESSURE: 62 MMHG | SYSTOLIC BLOOD PRESSURE: 116 MMHG

## 2020-07-14 VITALS — DIASTOLIC BLOOD PRESSURE: 55 MMHG | SYSTOLIC BLOOD PRESSURE: 120 MMHG

## 2020-07-14 VITALS — SYSTOLIC BLOOD PRESSURE: 139 MMHG | DIASTOLIC BLOOD PRESSURE: 66 MMHG

## 2020-07-14 LAB
BASOPHILS # BLD AUTO: 0 10*3/UL (ref 0–0.1)
BASOPHILS NFR BLD AUTO: 0.9 % (ref 0–1)
BUN SERPL-MCNC: 19 MG/DL (ref 7–24)
CHLORIDE SERPL-SCNC: 103 MMOL/L (ref 98–107)
CREAT SERPL-MCNC: 1 MG/DL (ref 0.55–1.02)
EOSINOPHIL # BLD AUTO: 0.1 10*3/UL (ref 0–0.4)
EOSINOPHIL # BLD AUTO: 2.1 % (ref 1–4)
ERYTHROCYTE [DISTWIDTH] IN BLOOD BY AUTOMATED COUNT: 26.2 % (ref 0–14.5)
HCT VFR BLD AUTO: 25.4 % (ref 37–47)
LYMPHOCYTES # BLD AUTO: 1 10*3/UL (ref 1.3–4.4)
LYMPHOCYTES NFR BLD AUTO: 20.3 % (ref 27–41)
MCH RBC QN AUTO: 25.2 PG (ref 27–31)
MCHC RBC AUTO-ENTMCNC: 29.9 G/DL (ref 33–37)
MCV RBC AUTO: 84.1 FL (ref 81–99)
MONOCYTES # BLD AUTO: 0.4 10*3/UL (ref 0.1–1)
MONOCYTES NFR BLD MANUAL: 8.1 % (ref 3–9)
NEUT #: 3.2 10*3/UL (ref 2.3–7.9)
NEUT %: 68 % (ref 47–73)
NRBC BLD QL AUTO: 0 10*3/UL (ref 0–0)
PLATELET # BLD AUTO: 156 10*3/UL (ref 130–400)
PMV BLD AUTO: 9.2 FL (ref 9.6–12.3)
POTASSIUM SERPL-SCNC: 3.3 MMOL/L (ref 3.5–5.1)
RBC # BLD AUTO: 3.02 10*6/UL (ref 4.1–5.1)
SODIUM SERPL-SCNC: 139 MMOL/L (ref 136–145)
WBC NRBC COR # BLD AUTO: 4.7 10*3/UL (ref 4.8–10.8)

## 2020-07-15 VITALS — DIASTOLIC BLOOD PRESSURE: 58 MMHG | SYSTOLIC BLOOD PRESSURE: 114 MMHG

## 2020-07-15 VITALS — DIASTOLIC BLOOD PRESSURE: 57 MMHG | SYSTOLIC BLOOD PRESSURE: 100 MMHG

## 2020-07-15 VITALS — DIASTOLIC BLOOD PRESSURE: 61 MMHG | SYSTOLIC BLOOD PRESSURE: 126 MMHG

## 2020-07-15 VITALS — DIASTOLIC BLOOD PRESSURE: 59 MMHG

## 2020-07-15 VITALS — DIASTOLIC BLOOD PRESSURE: 58 MMHG | SYSTOLIC BLOOD PRESSURE: 108 MMHG

## 2020-07-15 VITALS — DIASTOLIC BLOOD PRESSURE: 58 MMHG | SYSTOLIC BLOOD PRESSURE: 103 MMHG

## 2020-07-15 VITALS — DIASTOLIC BLOOD PRESSURE: 61 MMHG

## 2020-07-15 VITALS — SYSTOLIC BLOOD PRESSURE: 119 MMHG | DIASTOLIC BLOOD PRESSURE: 65 MMHG

## 2020-07-15 VITALS — DIASTOLIC BLOOD PRESSURE: 65 MMHG

## 2020-07-15 VITALS — DIASTOLIC BLOOD PRESSURE: 57 MMHG

## 2020-07-15 VITALS — DIASTOLIC BLOOD PRESSURE: 53 MMHG

## 2020-07-15 VITALS — DIASTOLIC BLOOD PRESSURE: 67 MMHG

## 2020-07-15 LAB
BASOPHILS # BLD AUTO: 0 10*3/UL (ref 0–0.1)
BASOPHILS NFR BLD AUTO: 0.4 % (ref 0–1)
BUN SERPL-MCNC: 20 MG/DL (ref 7–24)
BURR CELLS BLD QL SMEAR: (no result)
CHLORIDE SERPL-SCNC: 103 MMOL/L (ref 98–107)
CREAT SERPL-MCNC: 1.08 MG/DL (ref 0.55–1.02)
EOSINOPHIL # BLD AUTO: 0.1 10*3/UL (ref 0–0.4)
EOSINOPHIL # BLD AUTO: 1.8 % (ref 1–4)
ERYTHROCYTE [DISTWIDTH] IN BLOOD BY AUTOMATED COUNT: 25.6 % (ref 0–14.5)
ERYTHROCYTE [DISTWIDTH] IN BLOOD BY AUTOMATED COUNT: 26.8 % (ref 0–14.5)
HCT VFR BLD AUTO: 22.8 % (ref 37–47)
HCT VFR BLD AUTO: 25.3 % (ref 37–47)
LYMPHOCYTES # BLD AUTO: 0.7 10*3/UL (ref 1.3–4.4)
LYMPHOCYTES NFR BLD AUTO: 24.3 % (ref 27–41)
MCH RBC QN AUTO: 25.3 PG (ref 27–31)
MCH RBC QN AUTO: 25.9 PG (ref 27–31)
MCHC RBC AUTO-ENTMCNC: 29.2 G/DL (ref 33–37)
MCHC RBC AUTO-ENTMCNC: 29.8 G/DL (ref 33–37)
MCV RBC AUTO: 84.8 FL (ref 81–99)
MCV RBC AUTO: 88.5 FL (ref 81–99)
MONOCYTES # BLD AUTO: 0.2 10*3/UL (ref 0.1–1)
MONOCYTES NFR BLD MANUAL: 8.2 % (ref 3–9)
NEUT #: 1.8 10*3/UL (ref 2.3–7.9)
NEUT %: 64.6 % (ref 47–73)
NRBC BLD QL AUTO: 0 10*3/UL (ref 0–0)
NRBC BLD QL AUTO: 0 10*3/UL (ref 0–0)
PLATELET # BLD AUTO: 106 10*3/UL (ref 130–400)
PLATELET # BLD AUTO: 123 10*3/UL (ref 130–400)
PLATELET SUFFICIENCY: (no result)
PMV BLD AUTO: 10.2 FL (ref 9.6–12.3)
PMV BLD AUTO: 10.4 FL (ref 9.6–12.3)
POTASSIUM SERPL-SCNC: 4.2 MMOL/L (ref 3.5–5.1)
RBC # BLD AUTO: 2.69 10*6/UL (ref 4.1–5.1)
RBC # BLD AUTO: 2.86 10*6/UL (ref 4.1–5.1)
SODIUM SERPL-SCNC: 139 MMOL/L (ref 136–145)
TOTAL CELLS COUNTED: 100 #CELLS
WBC NRBC COR # BLD AUTO: 2.8 10*3/UL (ref 4.8–10.8)
WBC NRBC COR # BLD AUTO: 3.2 10*3/UL (ref 4.8–10.8)

## 2020-07-15 PROCEDURE — 30233N1 TRANSFUSION OF NONAUTOLOGOUS RED BLOOD CELLS INTO PERIPHERAL VEIN, PERCUTANEOUS APPROACH: ICD-10-PCS | Performed by: INTERNAL MEDICINE

## 2020-07-16 VITALS — DIASTOLIC BLOOD PRESSURE: 57 MMHG

## 2020-07-16 VITALS — DIASTOLIC BLOOD PRESSURE: 68 MMHG | SYSTOLIC BLOOD PRESSURE: 137 MMHG

## 2020-07-16 VITALS — DIASTOLIC BLOOD PRESSURE: 55 MMHG

## 2020-07-16 LAB
BASOPHILS # BLD AUTO: 0 10*3/UL (ref 0–0.1)
BASOPHILS NFR BLD AUTO: 0.6 % (ref 0–1)
BUN SERPL-MCNC: 19 MG/DL (ref 7–24)
CHLORIDE SERPL-SCNC: 103 MMOL/L (ref 98–107)
CREAT SERPL-MCNC: 1.06 MG/DL (ref 0.55–1.02)
EOSINOPHIL # BLD AUTO: 0.1 10*3/UL (ref 0–0.4)
EOSINOPHIL # BLD AUTO: 2 % (ref 1–4)
ERYTHROCYTE [DISTWIDTH] IN BLOOD BY AUTOMATED COUNT: 25.9 % (ref 0–14.5)
HCT VFR BLD AUTO: 26.8 % (ref 37–47)
LYMPHOCYTES # BLD AUTO: 0.9 10*3/UL (ref 1.3–4.4)
LYMPHOCYTES NFR BLD AUTO: 26.1 % (ref 27–41)
MCH RBC QN AUTO: 25.9 PG (ref 27–31)
MCHC RBC AUTO-ENTMCNC: 30.6 G/DL (ref 33–37)
MCV RBC AUTO: 84.8 FL (ref 81–99)
MONOCYTES # BLD AUTO: 0.3 10*3/UL (ref 0.1–1)
MONOCYTES NFR BLD MANUAL: 8.3 % (ref 3–9)
NEUT #: 2.2 10*3/UL (ref 2.3–7.9)
NEUT %: 62.7 % (ref 47–73)
NRBC BLD QL AUTO: 0 10*3/UL (ref 0–0)
PLATELET # BLD AUTO: 127 10*3/UL (ref 130–400)
PMV BLD AUTO: 10.1 FL (ref 9.6–12.3)
POTASSIUM SERPL-SCNC: 3.6 MMOL/L (ref 3.5–5.1)
RBC # BLD AUTO: 3.16 10*6/UL (ref 4.1–5.1)
SODIUM SERPL-SCNC: 136 MMOL/L (ref 136–145)
WBC NRBC COR # BLD AUTO: 3.5 10*3/UL (ref 4.8–10.8)

## 2020-07-16 PROCEDURE — 5A09357 ASSISTANCE WITH RESPIRATORY VENTILATION, LESS THAN 24 CONSECUTIVE HOURS, CONTINUOUS POSITIVE AIRWAY PRESSURE: ICD-10-PCS | Performed by: INTERNAL MEDICINE

## 2020-07-23 ENCOUNTER — HOSPITAL ENCOUNTER (INPATIENT)
Dept: HOSPITAL 83 - ED | Age: 75
LOS: 1 days | Discharge: HOSPICE | DRG: 871 | End: 2020-07-24
Attending: INTERNAL MEDICINE | Admitting: INTERNAL MEDICINE
Payer: MEDICARE

## 2020-07-23 VITALS — DIASTOLIC BLOOD PRESSURE: 61 MMHG

## 2020-07-23 VITALS — DIASTOLIC BLOOD PRESSURE: 80 MMHG | SYSTOLIC BLOOD PRESSURE: 165 MMHG

## 2020-07-23 VITALS — WEIGHT: 293 LBS | DIASTOLIC BLOOD PRESSURE: 80 MMHG | HEIGHT: 63 IN | BODY MASS INDEX: 51.91 KG/M2

## 2020-07-23 VITALS — SYSTOLIC BLOOD PRESSURE: 149 MMHG | DIASTOLIC BLOOD PRESSURE: 70 MMHG

## 2020-07-23 VITALS — DIASTOLIC BLOOD PRESSURE: 69 MMHG

## 2020-07-23 VITALS — DIASTOLIC BLOOD PRESSURE: 79 MMHG

## 2020-07-23 DIAGNOSIS — I50.33: ICD-10-CM

## 2020-07-23 DIAGNOSIS — I24.8: ICD-10-CM

## 2020-07-23 DIAGNOSIS — N17.0: ICD-10-CM

## 2020-07-23 DIAGNOSIS — E43: ICD-10-CM

## 2020-07-23 DIAGNOSIS — E53.9: ICD-10-CM

## 2020-07-23 DIAGNOSIS — J44.1: ICD-10-CM

## 2020-07-23 DIAGNOSIS — J96.22: ICD-10-CM

## 2020-07-23 DIAGNOSIS — I13.0: ICD-10-CM

## 2020-07-23 DIAGNOSIS — G89.4: ICD-10-CM

## 2020-07-23 DIAGNOSIS — A41.9: Primary | ICD-10-CM

## 2020-07-23 DIAGNOSIS — Z90.49: ICD-10-CM

## 2020-07-23 DIAGNOSIS — I33.0: ICD-10-CM

## 2020-07-23 DIAGNOSIS — E11.51: ICD-10-CM

## 2020-07-23 DIAGNOSIS — E11.65: ICD-10-CM

## 2020-07-23 DIAGNOSIS — K21.9: ICD-10-CM

## 2020-07-23 DIAGNOSIS — E11.22: ICD-10-CM

## 2020-07-23 DIAGNOSIS — Z66: ICD-10-CM

## 2020-07-23 DIAGNOSIS — Z88.8: ICD-10-CM

## 2020-07-23 DIAGNOSIS — E66.01: ICD-10-CM

## 2020-07-23 DIAGNOSIS — Z91.013: ICD-10-CM

## 2020-07-23 DIAGNOSIS — Z51.5: ICD-10-CM

## 2020-07-23 DIAGNOSIS — J96.21: ICD-10-CM

## 2020-07-23 DIAGNOSIS — Z79.4: ICD-10-CM

## 2020-07-23 DIAGNOSIS — E80.6: ICD-10-CM

## 2020-07-23 DIAGNOSIS — Z91.018: ICD-10-CM

## 2020-07-23 DIAGNOSIS — D61.818: ICD-10-CM

## 2020-07-23 DIAGNOSIS — Z82.49: ICD-10-CM

## 2020-07-23 DIAGNOSIS — Z96.651: ICD-10-CM

## 2020-07-23 DIAGNOSIS — E83.42: ICD-10-CM

## 2020-07-23 DIAGNOSIS — G47.33: ICD-10-CM

## 2020-07-23 DIAGNOSIS — Z20.828: ICD-10-CM

## 2020-07-23 DIAGNOSIS — E03.9: ICD-10-CM

## 2020-07-23 DIAGNOSIS — Z79.899: ICD-10-CM

## 2020-07-23 DIAGNOSIS — R82.71: ICD-10-CM

## 2020-07-23 DIAGNOSIS — M19.90: ICD-10-CM

## 2020-07-23 DIAGNOSIS — R65.20: ICD-10-CM

## 2020-07-23 DIAGNOSIS — N18.3: ICD-10-CM

## 2020-07-23 DIAGNOSIS — Z86.718: ICD-10-CM

## 2020-07-23 DIAGNOSIS — Z90.710: ICD-10-CM

## 2020-07-23 LAB
ALBUMIN SERPL-MCNC: 1.5 GM/DL (ref 3.1–4.5)
ALP SERPL-CCNC: 84 U/L (ref 45–117)
ALT SERPL W P-5'-P-CCNC: 8 U/L (ref 12–78)
APPEARANCE UR: CLEAR
APTT PPP: 31.1 SECONDS (ref 20–32.1)
AST SERPL-CCNC: 25 IU/L (ref 3–35)
BACTERIA #/AREA URNS HPF: (no result) /[HPF]
BASE EXCESS BLDA CALC-SCNC: 3.7 MMOL/L (ref -2–2)
BASOPHILS # BLD AUTO: 0 10*3/UL (ref 0–0.1)
BASOPHILS NFR BLD AUTO: 1 % (ref 0–1)
BILIRUB UR QL STRIP: NEGATIVE
BUN SERPL-MCNC: 21 MG/DL (ref 7–24)
CHLORIDE SERPL-SCNC: 109 MMOL/L (ref 98–107)
COLOR UR: YELLOW
CREAT SERPL-MCNC: 1.27 MG/DL (ref 0.55–1.02)
EOSINOPHIL # BLD AUTO: 0.2 10*3/UL (ref 0–0.4)
EOSINOPHIL # BLD AUTO: 3.8 % (ref 1–4)
EPI CELLS #/AREA URNS HPF: (no result) /[HPF]
ERYTHROCYTE [DISTWIDTH] IN BLOOD BY AUTOMATED COUNT: 29 % (ref 0–14.5)
GLUCOSE UR QL: NEGATIVE
HCO3 BLDA-SCNC: 29 MMOL/L (ref 22–26)
HCT VFR BLD AUTO: 29.7 % (ref 37–47)
HGB UR QL STRIP: (no result)
INR BLD: 1.3 (ref 2–3.5)
KETONES UR QL STRIP: NEGATIVE
LEUKOCYTE ESTERASE UR QL STRIP: (no result)
LYMPHOCYTES # BLD AUTO: 0.8 10*3/UL (ref 1.3–4.4)
LYMPHOCYTES NFR BLD AUTO: 19 % (ref 27–41)
MCH RBC QN AUTO: 27 PG (ref 27–31)
MCHC RBC AUTO-ENTMCNC: 30.6 G/DL (ref 33–37)
MCV RBC AUTO: 88.1 FL (ref 81–99)
MONOCYTES # BLD AUTO: 0.2 10*3/UL (ref 0.1–1)
MONOCYTES NFR BLD MANUAL: 5.8 % (ref 3–9)
MUCOUS THREADS URNS QL MICRO: (no result)
NEUT #: 2.9 10*3/UL (ref 2.3–7.9)
NEUT %: 69.7 % (ref 47–73)
NITRITE UR QL STRIP: NEGATIVE
NRBC BLD QL AUTO: 0 10*3/UL (ref 0–0)
PCO2 BLDA: 50 MMHG (ref 35–45)
PH BLDA: 7.38 [PH] (ref 7.35–7.45)
PH UR STRIP: 5 [PH] (ref 5–9)
PLATELET # BLD AUTO: 135 10*3/UL (ref 130–400)
PMV BLD AUTO: 9.1 FL (ref 9.6–12.3)
PO2 BLDA: 91 MMHG (ref 80–90)
POTASSIUM SERPL-SCNC: 3.6 MMOL/L (ref 3.5–5.1)
PROT SERPL-MCNC: 6.3 GM/DL (ref 6.4–8.2)
RBC # BLD AUTO: 3.37 10*6/UL (ref 4.1–5.1)
RBC #/AREA URNS HPF: (no result) RBC/HPF (ref 0–2)
SAO2 % BLDA: 97 % (ref 95–97)
SODIUM SERPL-SCNC: 142 MMOL/L (ref 136–145)
SP GR UR: 1.01 (ref 1–1.03)
TROPONIN I SERPL-MCNC: 0.16 NG/ML (ref ?–0.04)
UROBILINOGEN UR STRIP-MCNC: 0.2 E.U./DL (ref 0.2–1)
WBC #/AREA URNS HPF: (no result) WBC/HPF (ref 0–5)
WBC NRBC COR # BLD AUTO: 4.2 10*3/UL (ref 4.8–10.8)

## 2020-07-23 PROCEDURE — 5A09357 ASSISTANCE WITH RESPIRATORY VENTILATION, LESS THAN 24 CONSECUTIVE HOURS, CONTINUOUS POSITIVE AIRWAY PRESSURE: ICD-10-PCS | Performed by: INTERNAL MEDICINE

## 2020-07-23 NOTE — NUR
PATIENT FERREIRA CATH INSERTED BY THIS NURSE. ONE ATTEMPT BEFORE THIS NURSE BY
OVCT STUDENT. INSERTED AND 100ML OF URINE OUTPUT.

## 2020-07-23 NOTE — NUR
A 74 YEAR OLD FEMALE admitted to [facility], under the
services of LIANET Palomares DO with a diagnosis of ACUTE ON CHRONIC RESP
FAILURE, ELEVATED TROPS.
Chief complaint is SOB.
Patient arrived via stretcher from ER.
Monitor applied. Initial assessment completed.
Vital signs taken and recorded.
LIANET PALOMARES DO notified of admission to the unit.
Orders received.
See assessment for past medical history, medications
and allergies.
Patient and/or family oriented to unit. [facility]
visitation policy reviewed.
Clothing/patient valuable form completed.
 
MANUEL QUIJANO

## 2020-07-23 NOTE — NUR
Patient lying in bed, states she is feeling about the same, does have a little
pain in her left side of abdomen. Patient doesnt want any pain meds at this
time. Patient left with call light in reach.

## 2020-07-24 ENCOUNTER — HOSPITAL ENCOUNTER (INPATIENT)
Dept: HOSPITAL 83 - 5E | Age: 75
LOS: 7 days | DRG: 871 | End: 2020-07-31
Attending: INTERNAL MEDICINE | Admitting: INTERNAL MEDICINE
Payer: COMMERCIAL

## 2020-07-24 VITALS — DIASTOLIC BLOOD PRESSURE: 73 MMHG

## 2020-07-24 VITALS — DIASTOLIC BLOOD PRESSURE: 82 MMHG | SYSTOLIC BLOOD PRESSURE: 115 MMHG

## 2020-07-24 VITALS — WEIGHT: 293 LBS | BODY MASS INDEX: 51.91 KG/M2 | HEIGHT: 63 IN

## 2020-07-24 VITALS — DIASTOLIC BLOOD PRESSURE: 48 MMHG

## 2020-07-24 VITALS — SYSTOLIC BLOOD PRESSURE: 90 MMHG | DIASTOLIC BLOOD PRESSURE: 64 MMHG

## 2020-07-24 VITALS — DIASTOLIC BLOOD PRESSURE: 61 MMHG

## 2020-07-24 VITALS — DIASTOLIC BLOOD PRESSURE: 61 MMHG | SYSTOLIC BLOOD PRESSURE: 119 MMHG

## 2020-07-24 VITALS — DIASTOLIC BLOOD PRESSURE: 63 MMHG

## 2020-07-24 DIAGNOSIS — A41.9: Primary | ICD-10-CM

## 2020-07-24 DIAGNOSIS — N17.0: ICD-10-CM

## 2020-07-24 DIAGNOSIS — Z91.041: ICD-10-CM

## 2020-07-24 DIAGNOSIS — E11.22: ICD-10-CM

## 2020-07-24 DIAGNOSIS — Z79.4: ICD-10-CM

## 2020-07-24 DIAGNOSIS — I50.9: ICD-10-CM

## 2020-07-24 DIAGNOSIS — I38: ICD-10-CM

## 2020-07-24 DIAGNOSIS — N18.3: ICD-10-CM

## 2020-07-24 DIAGNOSIS — K21.9: ICD-10-CM

## 2020-07-24 DIAGNOSIS — E11.65: ICD-10-CM

## 2020-07-24 DIAGNOSIS — Z51.5: ICD-10-CM

## 2020-07-24 DIAGNOSIS — G47.33: ICD-10-CM

## 2020-07-24 DIAGNOSIS — I13.0: ICD-10-CM

## 2020-07-24 DIAGNOSIS — Z91.018: ICD-10-CM

## 2020-07-24 DIAGNOSIS — Z91.013: ICD-10-CM

## 2020-07-24 DIAGNOSIS — E43: ICD-10-CM

## 2020-07-24 DIAGNOSIS — J44.1: ICD-10-CM

## 2020-07-24 DIAGNOSIS — R65.20: ICD-10-CM

## 2020-07-24 DIAGNOSIS — J96.10: ICD-10-CM

## 2020-07-24 LAB
ALBUMIN SERPL-MCNC: 1.5 GM/DL (ref 3.1–4.5)
ALP SERPL-CCNC: 76 U/L (ref 45–117)
ALT SERPL W P-5'-P-CCNC: 7 U/L (ref 12–78)
AST SERPL-CCNC: 22 IU/L (ref 3–35)
BASE EXCESS BLDA CALC-SCNC: 4.8 MMOL/L (ref -2–2)
BASOPHILS # BLD AUTO: 0 10*3/UL (ref 0–0.1)
BASOPHILS NFR BLD AUTO: 0.7 % (ref 0–1)
BUN SERPL-MCNC: 24 MG/DL (ref 7–24)
CHLORIDE SERPL-SCNC: 104 MMOL/L (ref 98–107)
CREAT SERPL-MCNC: 1.23 MG/DL (ref 0.55–1.02)
EOSINOPHIL # BLD AUTO: 0 % (ref 1–4)
EOSINOPHIL # BLD AUTO: 0 10*3/UL (ref 0–0.4)
ERYTHROCYTE [DISTWIDTH] IN BLOOD BY AUTOMATED COUNT: 29.2 % (ref 0–14.5)
HCO3 BLDA-SCNC: 29 MMOL/L (ref 22–26)
HCT VFR BLD AUTO: 29.3 % (ref 37–47)
LYMPHOCYTES # BLD AUTO: 0.6 10*3/UL (ref 1.3–4.4)
LYMPHOCYTES NFR BLD AUTO: 20.1 % (ref 27–41)
MCH RBC QN AUTO: 27 PG (ref 27–31)
MCHC RBC AUTO-ENTMCNC: 31.1 G/DL (ref 33–37)
MCV RBC AUTO: 86.9 FL (ref 81–99)
MONOCYTES # BLD AUTO: 0.1 10*3/UL (ref 0.1–1)
MONOCYTES NFR BLD MANUAL: 2.2 % (ref 3–9)
NEUT #: 2.1 10*3/UL (ref 2.3–7.9)
NEUT %: 76.3 % (ref 47–73)
NRBC BLD QL AUTO: 0 % (ref 0–0)
PCO2 BLDA: 43 MMHG (ref 35–45)
PH BLDA: 7.45 [PH] (ref 7.35–7.45)
PLATELET # BLD AUTO: 103 10*3/UL (ref 130–400)
PMV BLD AUTO: 9.4 FL (ref 9.6–12.3)
PO2 BLDA: 83 MMHG (ref 80–90)
POTASSIUM SERPL-SCNC: 3.6 MMOL/L (ref 3.5–5.1)
PROT SERPL-MCNC: 6.5 GM/DL (ref 6.4–8.2)
RBC # BLD AUTO: 3.37 10*6/UL (ref 4.1–5.1)
SAO2 % BLDA: 97 % (ref 95–97)
SODIUM SERPL-SCNC: 142 MMOL/L (ref 136–145)
WBC NRBC COR # BLD AUTO: 2.7 10*3/UL (ref 4.8–10.8)

## 2020-07-24 PROCEDURE — 5A09357 ASSISTANCE WITH RESPIRATORY VENTILATION, LESS THAN 24 CONSECUTIVE HOURS, CONTINUOUS POSITIVE AIRWAY PRESSURE: ICD-10-PCS | Performed by: INTERNAL MEDICINE

## 2020-07-24 NOTE — NUR
LSW RECEIVED HOSPICE CONSULT FOR THE PATIENT TO RETURN TO [facility] UNDER
HOSPICE CARE. PATIENT IS SHORT TERM AND DOES NOT HAVE MEDICAID. LSW CONTACTED
[facility] TO CONFIRM THIS.
 
PALLIATIVE CARE WOULD BE ABLE TO FOLLOW AT [facility]. OR THE PATIENT WOULD
BE ABLE TO RETURN HOME WITH HOSPICE IF FAMILY IS AGREEABLE.
 
LSW SPOKE WITH [facility], AS THEY HAVE BEEN IN TOUCH WITH THE FAMILY.
PER FIDEL HE SPOKE WITH PATIENTS FAMILY LAST WEEK, WHO STATED THAT AT THAT TIME
DID NOT NEED HIS SERVICES. LSW WILL FOLLOW UP WITH THE FAMILY. 
RACHEAL IS AWARE.

## 2020-07-24 NOTE — NUR
JOSE WHITMAN [id number]
 
Please refer to the physician's history and physical for past medical history,
comorbid conditions, and allergies.
   Diagnosis: ELEVATED TROPONIN 1 LEVEL  ACUTE ON CHRONIC RESP.
 
   Quinn Score: 14,MODERATE RISK
 
WOUND DESCRIPTIONS:
Wound Number: 1
Location of the wound: left 2nd toe
Thickness: Partial
Size: 0.2cm x 0.2cm x 0.1cm
Tunneling: none
Undermining: none
Sinus Tract: none
Presence of Exudate: none
Amount: None
Color: Red
Odor: None
Periwound Skin Appearance: Normal
Wound edges: approximated
Pain (associated with wound): none at time of assessment
How does patient state this happened? pt unsure how this happened
 
Wound Number: 2
Location of the wound: right posterior thigh
Type of wound: IAD
Thickness: Partial
Size: 0.4cm x 4.0cm x 0.1cm
Tunneling: none
Undermining: none
Sinus Tract: none
Presence of Exudate: none
Amount: None
Color: Red
Odor: None
Periwound Skin Appearance: Normal
Wound edges: approximated
Pain (associated with wound): none at time of assessment
How does patient state this happened? pt unsure how this happened
 
Wound Number: 3
Location of the wound: right anterior lower extremity
Thickness: Partial
Size: 8.0cm x 4.0cm x <0.1cm
Tunneling: none
Undermining: none
Sinus Tract: none
Presence of Exudate: none
Amount: None
Color: Red
Odor: None
Periwound Skin Appearance: Normal
Wound edges: approximated
Pain (associated with wound): none at time of assessment
How does patient state this happened? pt unsure how this happened
 
Wound Number: 4
Location of the wound: left behind the knee
Type of wound: MASD
Thickness: Partial
Size: 4.0cm x 7.0cm x <0.1cm
Tunneling: none
Undermining: none
Sinus Tract: none
Presence of Exudate: Serosanguineous
Amount: Light
Color: Red
Odor: None
Periwound Skin Appearance: Normal
Wound edges: approximated
Pain (associated with wound): none at time of assessment
How does patient state this happened? pt unsure how this happened
   Surface the patient is resting on: Isoflex
 
SKIN PREVENTION RECOMMENDATION:
   1.  Pressure redistribution support surface as appropriate
   2.  Elevate heels
   3.  Remove boots/TEDS every shift and reapply
   4.  Head of bed 30 degrees as tolerated
   5.  Assess nutrition and hydration
   6.  Manage moisture
   7.  Avoid the use of containment devices while in bed
   8.  Use absorptive products on surfaces limit layers of linens on bed
   9.  Turn and reposition every 1-2 hours in bed and every 1 hour in chair as
       tolerated
   10. Weight shifts every 15 minutes while up in chair
   11. Offloading with pillows or device to keep heels elevated off bed
   12. Monitor skin at least every shift
   13. Inspect under medical devices twice a day
 
WOUND TREATMENT RECOMMENDATIONS:
Partial thickness guidelines: Cleanse left 2nd toe with nss and apply sureprep
around the wound hydrogel to wound bed and cover with bandaid every 2 days and
prn for soiling
Heel raiser pro boots to bilateral feet while in bed
Wheelchair cushion when oob
Cleanse left posterior thigh with nss and apply calazime every shift and prn
for soiling
Cleanse abdominal folds, bilateral groin areas and creases to bilateral lower
extremities with soap and water pat areas dry then apply nystatin powder every
8 hours.
Cleanse right shin with nss and apply sureprep to right shin and cover with
dsd daily and prn for soiling.
Consult podiatry for areas to bilateral shins and increased edema.

## 2020-07-24 NOTE — NUR
Time: 1745
A 74  year old FEMALE admitted to [facility] under services of [facility].
LEFT PICC LINE SECURE & PATENT. FERREIRA REMAINS SECURE & PATENT. PATIENT HAS
WOUNDS BUT REFUSES PICTURES & MEASUREMENTS. O2 ON AT 3L VIA NC..
VANI NELSON

## 2020-07-24 NOTE — NUR
in to see patient. Nurse in room. Discussed hospice services at
[facility]. No preference for hospice company. Discharge planner/social
worker notified.

## 2020-07-24 NOTE — NUR
[facility] HOSPICE WILL BE HERE AT 3PM TO SEE THE PATIENT. MEGHA SPOKE WITH
JOSE AND SHE STATED SHE WOULD INFORM RN KAREEM OF THIS.
 
[facility] BE [facility]TACTING FAMILY TO LET THEM KNOW
OF THE TIME.

## 2020-07-24 NOTE — NUR
PHYSICAL THERAPY
 
Screen recieved, patient admitted from [facility] with an
acute exaserbation of CHF, Endocarditis, and Bacteremia. Please consult PT if
patient has a decline in functional status from baseline. Thank you.
 
Yao Moyer SPT
Armida Mckeon PT

## 2020-07-24 NOTE — NUR
MEGHA SPOKE WITH DIRECTOR OF [facility] FIDEL.
 
FIDEL-DIRECTOR OF [facility]
APPROVED FOR FAMILY TO COME IN AND MEET WITH [facility]. LSW NOTIFIED
RN KAREEM OF THIS.
 
LSW WILL NOTIFY KAREEM AND FAMILY OF A TIME, ONCE THIS ISABELLAW HEARS BACK FROM
FIDEL-[facility].

## 2020-07-24 NOTE — NUR
MORPHINE & ZOFRAN GIVEN FOR INCREASED DIFFICULTY BREATHING AND MOIST
NONPROD COUGH. DR MUÑIZ ROUNDED AND SPOKE WITH PATIENT ABOUT HOSPICE. PATIENT
IS UNWAVERING THAT THAT IS WHAT SHE WANT.  HAS BEEN IN AND WILL
UPDATE NURSE AFTER SPEAKING WITH GRANDDAUGHTER PER DAUGHTER & PATIENT
REQUEST.. & NURSE ALONG WITH THE PATIENT SPOKE WITH THE DAUGHTER
ABOUT OPTIONS VIA PHONE IN THE PATIENT'S ROOM. DAUGHTER IS WILLING TO SUPPORT
HER MOTHER IN HER DECISION

## 2020-07-24 NOTE — NUR
LSW SPOKE WITH THE PATIENTS GRANDDAUGHTER ROLY. LSW EXPLAINED THE PATIENT
IS REQUESTING HOSPICE SERVICES. LSW EXPLAINED THE OPTIONS TO THE PATIENTS
GRANDDAUGHTER DOMINIC. LSW EXPLAINED PATIENT LIKELY WOULD NOT BE SKILLABLE.
LSW EXPLAINED HOSPICE COULD FOLLOW AT HOME. LSW EXPLAINED WE COULD SEE IF THE
PATIENT WOULD QUALIFY FOR GIP.
 
PATIENTS GRANDDAUGHTER HAD A COUPLE OF QUESTIONS ABOUT HOME WITH HOSPICE. THIS
LSW IS FOLLOWING UP ON FOR THE PATIENTS GRANDDAUGHTER. PATIENTS GRANDDAUGHTER
STATED SHE NEEDED TO SPEAK WITH OTHER FAMILY MEMBERS AND THINK ABOUT THE
OPTIONS PROVIDED TO HER. LSW EXPLAINED THIS LSW WOULD REACH BACK OUT TO HER
AFTER WHILE AND GIVE HER TIME TO THINK ABOUT THE OPTIONS.
 
 RACHEAL IS AWARE.

## 2020-07-24 NOTE — NUR
in to see patient. Nurse in room. HANNAH Willis, on the phone.
Patient voiced to Alba that she does not want any further treatment that she
wants to go home and be with the Lord. Discussed patient's inability to
return to [facility] with hospice due to not having Medicaid. Discussed the
possibility of patient going home with hospice services and Alba wishes for
the patient's granddaughter, Yasmeen, to be called. Alba states she is
agreeable with what Yasmeen decides. She states Yasmeen was supposed to have
the DPOAH transferred to her but it was never completed. Will reach out to
Yasmeen to explain the hospice situation.  notified and reaching
out to Yasmeen.

## 2020-07-24 NOTE — NUR
BED CHANGED & PATIENT REPOSITIONED FOR COMFORT - MORPHINE GIVEN POST TURNS FOR
INCREASED SOB 7 FEELING LIKE SHE IS SUFFOCATING

## 2020-07-24 NOTE — NUR
JOSE WHITMAN [id number]
 
Please refer to the physician's history and physical for past medical history,
comorbid conditions, and allergies.
   Diagnosis: ELEVATED TROPONIN 1 LEVEL  ACUTE ON CHRONIC RESP.
 
   Quinn Score: 14,MODERATE RISK
 
WOUND DESCRIPTIONS:
Wound Number: 1
Location of the wound: left 2nd toe
Thickness: Partial
Size: 0.2cm x 0.2cm x 0.1cm
Tunneling: none
Undermining: none
Sinus Tract: none
Presence of Exudate: none
Amount: None
Color: Red
Odor: None
Periwound Skin Appearance: Normal
Wound edges: approximated
Pain (associated with wound): none at time of assessment
How does patient state this happened? pt unsure how this happened
 
Wound Number: 2
Location of the wound: right posterior thigh
Type of wound: MASD
Thickness: Partial
Size: 0.4cm x 4.0cm x 0.1cm
Tunneling: none
Undermining: none
Sinus Tract: none
Presence of Exudate: none
Amount: None
Color: Red
Odor: None
Periwound Skin Appearance: Normal
Wound edges: approximated
Pain (associated with wound): none at time of assessment
How does patient state this happened? pt unsure how this happened
 
Wound Number: 3
Location of the wound: right anterior lower extremity
Thickness: Partial
Size: 8.0cm x 4.0cm x <0.1cm
Tunneling: none
Undermining: none
Sinus Tract: none
Presence of Exudate: none
Amount: None
Color: Red
Odor: None
Periwound Skin Appearance: Normal
Wound edges: approximated
Pain (associated with wound): none at time of assessment
How does patient state this happened? pt unsure how this happened
 
Wound Number: 4
Location of the wound: left behind the knee
Type of wound: MASD
Thickness: Partial
Size: 4.0cm x 7.0cm x <0.1cm
Tunneling: none
Undermining: none
Sinus Tract: none
Presence of Exudate: Serosanguineous
Amount: Light
Color: Red
Odor: None
Periwound Skin Appearance: Normal
Wound edges: approximated
Pain (associated with wound): none at time of assessment
How does patient state this happened? pt unsure how this happened
   Surface the patient is resting on: Isoflex
 
SKIN PREVENTION RECOMMENDATION:
   1.  Pressure redistribution support surface as appropriate
   2.  Elevate heels
   3.  Remove boots/TEDS every shift and reapply
   4.  Head of bed 30 degrees as tolerated
   5.  Assess nutrition and hydration
   6.  Manage moisture
   7.  Avoid the use of containment devices while in bed
   8.  Use absorptive products on surfaces limit layers of linens on bed
   9.  Turn and reposition every 1-2 hours in bed and every 1 hour in chair as
       tolerated
   10. Weight shifts every 15 minutes while up in chair
   11. Offloading with pillows or device to keep heels elevated off bed
   12. Monitor skin at least every shift
   13. Inspect under medical devices twice a day
 
WOUND TREATMENT RECOMMENDATIONS:
Partial thickness guidelines: Cleanse left 2nd toe with nss and apply sureprep
around the wound hydrogel to wound bed and cover with bandaid every 2 days and
prn for soiling
Heel raiser pro boots to bilateral feet while in bed
Wheelchair cushion when oob
Cleanse left posterior thigh with nss and apply calazime every shift and prn
for soiling
Cleanse abdominal folds, bilateral groin areas and creases to bilateral lower
extremities with soap and water pat areas dry then apply nystatin powder every
8 hours.
Cleanse right shin with nss and apply sureprep to right shin and cover with
dsd daily and prn for soiling.
Consult podiatry for areas to bilateral shins and increased edema.

## 2020-07-24 NOTE — NUR
LSW REACHED BACK OUT TO THE PATIENTS GRANDDAUGHTER WITH ANSWER TO HER
QUESTION. PATIENTS GRANDDAUGHTER STATED THAT SHE WOULD LIKE TO HAVE THE
PATIENT ASSESSED FOR GIP WITH [facility].
 
LSW CONTACTED [facility] AND FAXED OVER REFERRAL FOR GIP.

## 2020-07-24 NOTE — NUR
This nurse along with Marquita Culp RN caring for patient went to obtained
photographed of areas and patient refused photographs at this time.

## 2020-07-25 VITALS — DIASTOLIC BLOOD PRESSURE: 49 MMHG

## 2020-07-25 VITALS — SYSTOLIC BLOOD PRESSURE: 118 MMHG | DIASTOLIC BLOOD PRESSURE: 53 MMHG

## 2020-07-25 VITALS — DIASTOLIC BLOOD PRESSURE: 52 MMHG | SYSTOLIC BLOOD PRESSURE: 110 MMHG

## 2020-07-25 VITALS — DIASTOLIC BLOOD PRESSURE: 56 MMHG

## 2020-07-25 VITALS — DIASTOLIC BLOOD PRESSURE: 74 MMHG | SYSTOLIC BLOOD PRESSURE: 132 MMHG

## 2020-07-25 NOTE — NUR
IN TO ROOM. PATIENT ASLEEP BUT AWAKENS EASILY. COMPLAINS OF PAIN AT THIS TIME.
PLEASANT AND COOPERATIVE WITH CARE. O2 3L INTACT. NO SOB NOTED.
 
BED ALARM ON. CALL LIGHT WITHIN REACH. BED IN LOWEST LOCKED POSITION. WILL
CONTINUE TO MONITOR.

## 2020-07-25 NOTE — NUR
PT IS NOW [facility] HOSPICE WITH [facility].

## 2020-07-25 NOTE — NUR
PT COMPLAINS OF BACK AND SHOULDER PAIN. PRN MORPHINE ADMINISTERED AT THIS
TIME. WILL MONITOR FOR EFFECTIVENESS.

## 2020-07-25 NOTE — NUR
PT MEDICATED WITH PRN MORPHINE AT THIS TIME FOR ALL OVER PAIN. PT AWAKE, ALERT
AND ORIENTED. 3L NC INTACT. NO SOB NOTED.
 
CALL LIGHT WITHIN REACH. WILL MONITOR FOR EFFECTIVENESS.

## 2020-07-25 NOTE — NUR
PT RESTING COMFORTABLY IN BED. EYES ARE CLOSED. RESPIRATIONS ARE EASY AND
REGULAR. PRN MORPHINE CONSIDERED EFFECTIVE. WILL CONTINUE TO MONITOR.

## 2020-07-26 VITALS — DIASTOLIC BLOOD PRESSURE: 71 MMHG

## 2020-07-26 VITALS — SYSTOLIC BLOOD PRESSURE: 124 MMHG | DIASTOLIC BLOOD PRESSURE: 52 MMHG

## 2020-07-26 VITALS — DIASTOLIC BLOOD PRESSURE: 73 MMHG

## 2020-07-26 VITALS — DIASTOLIC BLOOD PRESSURE: 78 MMHG

## 2020-07-26 VITALS — DIASTOLIC BLOOD PRESSURE: 65 MMHG

## 2020-07-26 NOTE — NUR
PT COMPLAINS OF ALL OVER PAIN. PRN MORPHINE ADMINISTERED. WILL TELL NIGHT
SHIFT NURSE TO MONITOR FOR EFFECTIVENESS.

## 2020-07-26 NOTE — NUR
PT COMPLAINS OF SHOULDER PAIN. PRN MORPHINE ADMINISTERED AT THIS TIME. WILL
MONITOR FOR EFFECTIVENESS.

## 2020-07-26 NOTE — NUR
PRN MORPHINE ADMINISTERED AT THIS TIME FOR COMPLAINTS OF SHOULDER PAIN. WILL
MONITOR FOR EFFECTIVENESS.

## 2020-07-26 NOTE — NUR
PATIENT SITTING UP IN BED. AWAKE, ALERT AND ORIENTED. PT PLEASANT AND
COOPERATIVE WITH CARE. C/O SHOULDER PAIN BUT NO OTHER STATED COMPLAINTS.
 
3L 02 INTACT, NO SOB OR DISTRESS NOTED.
 
BED IN LOWEST LOCKED POSITION AND CALL LIGHT WITHIN REACH. WILL CONTINUE TO
MONITOR.

## 2020-07-26 NOTE — NUR
PATIENT IS RESTING IN BED WITH EYES CLOSED. NO DISTRESS OR SOB NOTED. PRN
ZOFRAN, MORPHINE AND ATIVAN CONSIDERED EFFECTIVE.
 
RESPIRATIONS ARE EASY AND REGULAR. BED IN LOWEST LOCKED POSITION AND CALL
LIGHT WITHIN REACH. WILL CONTINUE TO MONITOR.

## 2020-07-26 NOTE — NUR
PATIENT REPORTS 7.5/10 PAIN TO RT ARM/SHOULDER. PO MORPHINE GIVEN AT THIS
TIME/ RT ARM IS EDEMATOUS, ELEVATED ON PILLOW

## 2020-07-26 NOTE — NUR
PATIENT DROWSY UPON RN ENTERING ROOM. STATES SHE HAS NO PAIN BUT NEEDS
ASSISTANCE TO EAT HER SALAD. PATIENT ASSISTED TO EAT SALAD AND A COOKIE.
ENCOURAGED TO USE CALL LIGHT FOR ASSISTANCE, VERBALIZED UNDERSTANDING

## 2020-07-26 NOTE — NUR
PT RESTING COMFORTABLY IN BED. STATES MORPHINE WAS EFFECTIVE. NO SOB DISTRESS
NOTED.
 
RESPIRATIONS ARE EASY AND REGULAR. 3L NC INTACT
 
CALL LIGHT WITHIN REACH. WILL CONTINUE TO MONITOR.

## 2020-07-27 VITALS — DIASTOLIC BLOOD PRESSURE: 66 MMHG | SYSTOLIC BLOOD PRESSURE: 128 MMHG

## 2020-07-27 VITALS — DIASTOLIC BLOOD PRESSURE: 55 MMHG | SYSTOLIC BLOOD PRESSURE: 119 MMHG

## 2020-07-27 VITALS — DIASTOLIC BLOOD PRESSURE: 60 MMHG

## 2020-07-27 VITALS — DIASTOLIC BLOOD PRESSURE: 66 MMHG | SYSTOLIC BLOOD PRESSURE: 140 MMHG

## 2020-07-27 VITALS — DIASTOLIC BLOOD PRESSURE: 65 MMHG

## 2020-07-27 NOTE — NUR
Jennifer HARMON notified of wound care recommendations. Patient returned Lizeth's call. Please call to advise.

## 2020-07-27 NOTE — NUR
Nutritional Support Services Note: Pt is under [facility]. Appetite
remains good. Regular diet as ordered. She needs fed/assist with meals. Ht.5'3
Wt.320#.  [place] all food preferences and assist iwth meals as needed. No other
nutrition intervention needed at this time. She doesn't want a supplement at
this time.                           Nancy Bradford Rdn Ld

## 2020-07-27 NOTE — NUR
PT IS FUSSY AND COMPLAINING OF ALL OVER PAIN. PRN ATIVAN AND MORPHINE
ADMINISTERED AT THIS TIME. WILL TELL NIGHT SHIFT NURSE TO REASSESS
EFFECTIVENESS.

## 2020-07-27 NOTE — NUR
IN TO ROOM. PATIENT ASLEEP BUT AWAKENED EASILY. BREAKFAST AT BEDSIDE AND
PATIENT STATES SHE IS HUNGRY. FED WITHOUT INCIDENT. 100% OF BREAKFAST
CONSUMED. NO STATED COMPLAINTS AT THIS TIME. DENIES PAIN
 
RESPIRATIONS ARE EASY AND REGULAR. BED IN LOWEST LOCKED POSITION AND CALL
LIGHT WITHIN REACH. WILL CONTINUE TO MONITOR.

## 2020-07-27 NOTE — NUR
PT COMPLAINS OF ALL OVER PAIN. PRN MORPHINE ADMINISTERED AT THIS TIME. WILL
MONITOR FOR EFFECTIVENESS.

## 2020-07-27 NOTE — NUR
LATE NOTE: [facility] HOSPICE SW WAS IN TO SEE THE PATIENT. LSW ESCORTED
[facility] TO THE PATIENT ROOM. PATIENT STATED SHE WOULD LIKE TO SEE HER
DAUGHTER TRISTON.
 
LSW SPOKE WITH TELE MANAGER LIANET, BOTH [facility] AND PATIENTS DAUGHTER
WERE APPROVED TO COME IN TO THE FACILITY.
 
[facility] IS WORKING ON THE DISCHARGE FOR THE PATIENT.

## 2020-07-27 NOTE — NUR
IN TO ROOM. PATIENT ASLEEP. NO SOB OR DISTRESS NOTED AT THIS TIME.
 
RESPIRATIONS ARE EASY AND REGULAR. 3L O2 NC INTACT.
BED IN LOWEST LOCKED POSITION AND CALL LIGHT WITHIN REACH. WILL CONTINUE TO
MONITOR.

## 2020-07-28 VITALS — SYSTOLIC BLOOD PRESSURE: 127 MMHG | DIASTOLIC BLOOD PRESSURE: 56 MMHG

## 2020-07-28 VITALS — DIASTOLIC BLOOD PRESSURE: 55 MMHG

## 2020-07-28 VITALS — DIASTOLIC BLOOD PRESSURE: 58 MMHG

## 2020-07-28 VITALS — DIASTOLIC BLOOD PRESSURE: 72 MMHG | SYSTOLIC BLOOD PRESSURE: 137 MMHG

## 2020-07-28 VITALS — DIASTOLIC BLOOD PRESSURE: 63 MMHG

## 2020-07-28 NOTE — NUR
PATIENTS FERREIRA CATHETER HAS NOT LEAKED ANYMORE SINCE BEING READJUSTED AND
FLUSHED. PATIENT REMAINS ASLEEP.

## 2020-07-28 NOTE — NUR
PATIENT RESTING IN BED. SPEECH MUMBLED. RHONCHI HEARD THROUGHOUT. JVD NOTED.
GENERALIZED ANASARCHA. PATIENT REPEATING NAME WHEN ASKED NAME AND DATE. BED
ALARM ON, BED IN LOW POSITION, CALL LIGHT IN REACH

## 2020-07-28 NOTE — NUR
ISABELLAW RECEIVED A CALL FROM [facility]. PER GENARO FAMILY IS NOT ABLE TO
MANAGE THE PATIENT AT HOME. GENARO IS SPEAKING WITH HER SUPERVISOR TO SEE WHAT
OPTIONS ARE AVAILABLE TO THE FAMILY. PER GENARO THEY DO NOT HAVE A HOSPICE
HOUSE AND AT THE TIME DOES NOT FEEL SHE WOULD QUALIFY. GENARO STATED THAT FAMILY
CANNOT AFFORD ROOM AND BOARD AT A NH.
 
PER GENARO SHE IS GOING TO SEE WHAT TIME FRAME THEY HAVE TO WORK WITH AS FAR AS
THE PATIENT REMAINING GIP. MEGHA DID EXPLAIN JALEEL DIEGO PROGRESS NOTE FROM
TODAY. GENARO STATED SHE WOULD SPEAK WITH RN ANJELICA AND FIND OUT HOW HIS VISIT WENT.
ISABELLAW TO FOLLOW.

## 2020-07-28 NOTE — NUR
PRN SL MORPHINE AND PO ATIVAN EFFECTIVE; PATIENT RESTING QUIETLY W/NO S/S PAIN
OR DISTRESS.  PATIENT REFUSED PO MIRALAX WITH MORNING MEDS TODAY.

## 2020-07-28 NOTE — NUR
PATIENTS FERREIRA HAS LEAKED, AND SATURATED THE PADS UNDER THE BED. FERREIRA BALLOON
CHECKED AND SILL CONTAINS THE ORIGINAL 10ML OF SALINE.
FERREIRA WAS IRRIGATED O SE IF IT AS BLOCKED.
PATIENT TOLERATED WELL. RN WILL CONTINUE TO MONITOR

## 2020-07-28 NOTE — NUR
MEGHA RECEIVED CALL FROM THE PATIENTS GRANDDAUGHTER ROLY. ROLY STATED
THAT SHE WANTS THE PATIENT TO RETURN HOME WITH HOSPICE. ISABELLAW NOTIFIED NP
SVEN. THERE WILL NEED TO BE DME PLACED IN THE HOME. MEGHA REACHED OUT TO
[facility] ABOUT THE STATUS OF THE DME BEING DELIVERED TO THE HOME.
WILL AWAIT TO HEAR BACK FROM [facility].

## 2020-07-28 NOTE — NUR
patient resting quietly after recieving earlier prn medications. appear to
have been effective.
rn will continue to monitor

## 2020-07-29 VITALS — DIASTOLIC BLOOD PRESSURE: 57 MMHG

## 2020-07-29 VITALS — DIASTOLIC BLOOD PRESSURE: 54 MMHG

## 2020-07-29 VITALS — DIASTOLIC BLOOD PRESSURE: 49 MMHG

## 2020-07-29 VITALS — DIASTOLIC BLOOD PRESSURE: 70 MMHG

## 2020-07-29 NOTE — NUR
PATIENT STILL SLEEPING. AROUSES TO VERBAL STIMULI. ASKED PATIENT IF SHE WAS IN
ANY MORE PAIN. SHE SHOOK HER HEAD NO. MORPHINE CONTINUES TO RUN AT 2MG/HR.
SPOKE WITH PATIENTS GRANDDAUGHTER ROLY ON THE PHONE. NOTIFIED HER THAT
THERE ARE NO OTHER CHANGES. SHE STATED THAT'S HOW SHE WAS EARLIER WHEN SHE SAW
HER AND SHE'S GLAD THAT SHE APPEARS COMFORTABLE. ENCOURAGED KELVIN TO
CALL BACK AT ANY TIME THROUGHOUT THE NIGHT IF SHE NEEDS TO CHECK ON HER. CALL
LIGHT WITHIN REACH, WILL MONITOR

## 2020-07-29 NOTE — NUR
ADMINISTERED PRN IV ROBINUL FOR MOIST SECRETIONS.  PATIENT'S TWO DAUGHTERS
AND GRANDDAUGHTER VISITING AT BEDSIDE.

## 2020-07-29 NOTE — NUR
PATIENT'S DAUGHTER NOTIFIED OF PATIENT'S CONDITION/INCREASED PAIN/PCA MORPHINE
TREATMENT AND THAT 3 VISITORS AUTHORIZED BY FREDY RODRIGUEZ RN

## 2020-07-29 NOTE — NUR
IN TO ASSESS PATIENT. PATIENTS DAUGHTER TRISTON AT BEDSIDE. PATIENT LYING IN
BED. BRIEFLY WAKENS TO VERBAL STIMULI. WHEN ASKED IF SHE WAS IN PAIN AT THIS
TIME PATIENT SHOOK HEAD NO. MORPHINE DRIP RUNNING AT 2MG/HR INTO PICC IN THE
LEFT ARM. NASAL CANNULA INTACT. PATIENTS DAUGHTER HAS NO QUESTIONS AT THIS
TIME AND STATED THAT SHE WOULD BE LEAVING ABOUT 9PM. PATIENTS FERREIRA DRAINING
SAMARA URINE. ANASARCA NOTED. ENCOURAGED DAUGHTER IF SHE HAD ANY QUESTIONS TO
PLEASE ASK. WILL CONTINUE TO MONITOR

## 2020-07-29 NOTE — NUR
[facility] HOSPICE NURSE IN TO SEE PATIENT RE: PLAN OF CARE, INCREASE IN
PAIN AND AGITATION FROM YESTERDAY NOTED. HE SPOKE WITH TAMICA RICHTER, WHO ORDERED
MORPHINE PCA AT 2MG/HR AND ATIVAN IV 1MG Q2HR PRN AND ALSO ROBINUL IV Q4H PRN
SECRETIONS.

## 2020-07-29 NOTE — NUR
ATTEMPTED TO GIVE PATIENT ORDERED ATROPINE DROPS FOR SECRETIONS. UNAVAILABLE
FROM PHARMACY AT THIS TIME

## 2020-07-30 VITALS — DIASTOLIC BLOOD PRESSURE: 63 MMHG

## 2020-07-30 VITALS — SYSTOLIC BLOOD PRESSURE: 123 MMHG | DIASTOLIC BLOOD PRESSURE: 68 MMHG

## 2020-07-30 VITALS — DIASTOLIC BLOOD PRESSURE: 85 MMHG | SYSTOLIC BLOOD PRESSURE: 140 MMHG

## 2020-07-30 VITALS — DIASTOLIC BLOOD PRESSURE: 68 MMHG

## 2020-07-30 VITALS — DIASTOLIC BLOOD PRESSURE: 44 MMHG

## 2020-07-30 NOTE — NUR
IN TO CHECK ON PATIENT. PATIENT LAYING WITH EYES OPEN. ASKED PATIENT IF SHE
WAS IN PAIN, SHE STATED NO. ASKED THE PATIENT IF THERE WAS ANYTHING I COULD
GET HER AND SHE SAID "SOMETHING COLD". PATIENT DRANK HALF A CUP OF WATER.
PATIENT THEN REQUESTING MORE BLANKETS SHE STATED SHE WAS COLD. WARM BLANKETS
PROVIDED. PATIENT DENIES ANY OTHER NEEDS, STATES RIGHT NOW SHE IS COMFORTABLE.
WILL CONTINUE TO MONITOR

## 2020-07-30 NOTE — NUR
PRN ATIVAN WAS EFFECTIVE, , NO RESTLESSNESS NOTED AT THIS TIME. RESTING
QUIETLY. BED IN LOWEST POSITION, CALL LIGHT WITHIN REACH.

## 2020-07-30 NOTE — NUR
IN TO ASSESS PATIENT. PATIENT RESTING COMFORTABLY. PATIENTS DAUGHTER AT
BEDSIDE. STATES SHE DOESN'T THINK SHE'S IN ANY MORE PAIN THAN BEFORE AND SHE
APPEARS COMFORTABLE.SHE STATES SHE BECAME LESS RESPONSIVE TODAY. ASSESSMENT
COMPLETE. PCA PUMP CHECKED. WILL CONTINUE TO MONITOR

## 2020-07-30 NOTE — NUR
PATIENT REMAINS LETHARGIC. BARELY OPENS EYES TO VERBAL STIMULI. SECRETIONS ARE
LESSENED BUT STILL AUDIBLE. WILL CONTINUE TO MONITOR

## 2020-07-30 NOTE — NUR
SHIFT DIRECTOR GIVES OKAY FOR OTHER VISITORS TO SEE PATIENT TODAY DUE TO
HOSPICE CIRCUMSTANCES. PATIENT IS NOT TO HAVE MORE THAN THREE VISITORS AT ONE
TIME. PT FAMILY AND REGISTRATION AWARE OF THIS.

## 2020-07-30 NOTE — NUR
NEW BAG OF MORPHINE HUNG, SEE EMAR. PT TOLERATING WELL, DOES NOT AROUSE TO
VERBAL STIMULI. FAMILY REMAINS AT BEDSIDE.

## 2020-07-30 NOTE — NUR
PATIENT NONRESPONSIVE TO VERBAL STIMULI, REPSIRATIONS 20 EASY AT THIS TIME ON
3L NC. DAUGHTER REMAINS AT BEDSIDE.

## 2020-07-30 NOTE — NUR
PRN ROBINUL GIVEN FOR MOIST SECRETIONS. PATIENT NOTED WITH A STRONG COUGH
DESPITE BEING LETHARGIC AND IS ABLE TO CLEAR MOST OF THE SECRETIONS IN HER
THROAT. WILL CONITNUE TO MONITOR

## 2020-07-30 NOTE — NUR
PATIENTS SECRETIONS APPEARS LESSENED, BUT NOT GONE. PATIENT OPENS EYES AND
ATTEMPTS TO SPEAK WHEN ADDRESSED. ABLE TO NOD AND SHAKE HEAD YES OR NO.
MORPHINE STILL RUNNNING AT 2MG/HR. IV FLUIDS AT KVO. WILL CONTINUE TO MONITOR

## 2020-07-31 VITALS — DIASTOLIC BLOOD PRESSURE: 51 MMHG | SYSTOLIC BLOOD PRESSURE: 120 MMHG

## 2020-07-31 NOTE — NUR
PATIENTS BREATHING SLOWING. MOIST RESPIRATIONS. PATIENT STILL AROUSABLE VIA
VERBAL STIMULI BUT BARELY ANSWERS. 3L INTACT AND PULSE OX REMAINS IN NORMAL
LIMITS. IV FLUIDS/MORPHINE INFUSING. CALL LIGHT WITHIN REACH, WILL MONITOR

## 2020-07-31 NOTE — NUR
PATIENT APPEARS TO BE IN LESS DISTRESS. MORPHINE CONTINUES TO INFUSE, IV
FLUIDS INFUSING. WILL CONTINUE TO MONITOR

## 2020-07-31 NOTE — NUR
SPOKE WITH [facility]. NOTIFIED THEM OF PATIENTS RECENT HISTORY OF SEPSIS AND
ENDOCARDITIS AND THEY STATED THAT THE PATIENT HAD BEEN RULED OUT D/T THIS.
REFFERAL NUMBER [id number]

## 2020-07-31 NOTE — NUR
PRN SUBLINGUAL MORPHINE GIVEN AS PATIENT IS CONTINUING TO MOAN AND APPEAR
UNCOMFORTABLE. WILL CONTINUE TO MONITOR

## 2020-07-31 NOTE — NUR
SPOKE WITH STEVIE  HOME REGARDING PATIENT'S BODY BEING RELEASED TO
THEM ONCE FAMILY LEAVES FACILITY. APPROPRIATE INFORMATION GIVEN TO
 AT THIS TIME.

## 2020-07-31 NOTE — NUR
SPOKE WITH PT DAUGHTER AND NEXT OF KIN, TRISTON DOE, REGARDING PT DEATH AND
REGARDING VISITATION POLICY.

## 2023-01-25 NOTE — NUR
Called pts mother and scheduled appointment   PATIENT STILL APPEARS TO BE COMFORTABLE. STILL OCCASIONALLY OPENING EYES TO
VERBAL STIMULI AND ATTEMPTING TO ANSWER QUESTIONS. MORPHINE AND IV FLUIDS
CONTINUE TO INFUSE. VITAL SIGNS STABLE. WILL CONTINUE TO MONITOR

## 2024-01-01 NOTE — NUR
LSW RECEIVED DPOA-HC FROM [facility] OFFICE. TRISTON DOE IS LISTED AS
THE FIRST AND ONLY CONTACT. LSW CONTACTED TRISTON AND ROLY BOTH AND
EXPLAINED THIS. ROLY THOUGHT [facility] HAD ANOTHER DPOA-HC THAT
WAS MORE RECENT.
 
LSW CONTACTED [facility], THERE IS NOTHING ON FILE WITH THEM. LSW EXPLAINED
THIS TO ROLY.
 
TRISTON IS AGREEABLE TO HAVE THE PATIENT RETURN TO [facility]. LSW FAXED DPOA-HC PAPERS
TO [facility] ALONG WITH PATIENTS SCRIPT FOR OLIVIA.
 
*LSW VISITED THE PATIENT EARLIER IN THE WEEK ABOUT NEW DPOA PAPERS. PATIENT
WAS UNABLE TO DISCUSS THE PAPERS DUE TO SEVERE PAIN ALL OVER, AND THEN
PATIENTS MENTAL STATUS WAS QUESTIONABLE. WILL HAVE [facility] FOLLOW UP WITH
THE PATIENT AND FAMILY TO SEE IF THEY WANT TO UPDATE THE DPOA-HC ONCE THE
PATIENT RETURN TO THE FACILITY AND MENTATION MORE CLEARER. No Yes

## 2024-06-22 NOTE — NUR
Problem: Patient Centered Care  Goal: Patient preferences are identified and integrated in the patient's plan of care  Description: Interventions:  - What would you like us to know as we care for you? I am from home with my spouse  - Provide timely, complete, and accurate information to patient/family  - Incorporate patient and family knowledge, values, beliefs, and cultural backgrounds into the planning and delivery of care  - Encourage patient/family to participate in care and decision-making at the level they choose  - Honor patient and family perspectives and choices  Outcome: Progressing     Problem: Patient/Family Goals  Goal: Patient/Family Long Term Goal  Description: Patient's Long Term Goal: Discharge home    Interventions:  - - Monitor vitals  - Monitor appropriate labs  - Administer medications as ordered  - Follow MD's orders  - Update patient on plan of care   - Discharge planning     - See additional Care Plan goals for specific interventions  Outcome: Progressing  Goal: Patient/Family Short Term Goal  Description: Patient's Short Term Goal:     Interventions:  - See additional Care Plan goals for specific interventions  Outcome: Progressing     Problem: Impaired Swallowing  Goal: Minimize aspiration risk  Description: Interventions:  - Patient should be alert and upright for all feedings (90 degrees preferred)  - Offer food and liquids at a slow rate  - No straws  - Encourage small bites of food and small sips of liquid  - Offer pills one at a time, crush or deliver with applesauce as needed  - Discontinue feeding and notify MD (or speech pathologist) if coughing or persistent throat clearing or wet/gurgly vocal quality is noted  Outcome: Progressing     Problem: PAIN - ADULT  Goal: Verbalizes/displays adequate comfort level or patient's stated pain goal  Description: INTERVENTIONS:  - Encourage pt to monitor pain and request assistance  - Assess pain using appropriate pain scale  - Administer  UPON ENTERING ROOM PATIENT IS FUSSY, CRYING AND COMPLAINING OF NAUSEA. PRN
ATIVAN AND ZOFRAN ADMINISTERED AT THIS TIME. WILL MONITOR FOR EFFECTIVENESS. analgesics based on type and severity of pain and evaluate response  - Implement non-pharmacological measures as appropriate and evaluate response  - Consider cultural and social influences on pain and pain management  - Manage/alleviate anxiety  - Utilize distraction and/or relaxation techniques  - Monitor for opioid side effects  - Notify MD/LIP if interventions unsuccessful or patient reports new pain  - Anticipate increased pain with activity and pre-medicate as appropriate  Outcome: Progressing     Problem: RISK FOR INFECTION - ADULT  Goal: Absence of fever/infection during anticipated neutropenic period  Description: INTERVENTIONS  - Monitor WBC  - Administer growth factors as ordered  - Implement neutropenic guidelines  Outcome: Progressing     Problem: DISCHARGE PLANNING  Goal: Discharge to home or other facility with appropriate resources  Description: INTERVENTIONS:  - Identify barriers to discharge w/pt and caregiver  - Include patient/family/discharge partner in discharge planning  - Arrange for needed discharge resources and transportation as appropriate  - Identify discharge learning needs (meds, wound care, etc)  - Arrange for interpreters to assist at discharge as needed  - Consider post-discharge preferences of patient/family/discharge partner  - Complete POLST form as appropriate  - Assess patient's ability to be responsible for managing their own health  - Refer to Case Management Department for coordinating discharge planning if the patient needs post-hospital services based on physician/LIP order or complex needs related to functional status, cognitive ability or social support system  Outcome: Progressing   Scheduled medications given- see MAR, bed at lowest position, bed alarm on, belongings and call light within reach. Frequent staff rounding.